# Patient Record
Sex: FEMALE | Race: WHITE | NOT HISPANIC OR LATINO | Employment: FULL TIME | URBAN - METROPOLITAN AREA
[De-identification: names, ages, dates, MRNs, and addresses within clinical notes are randomized per-mention and may not be internally consistent; named-entity substitution may affect disease eponyms.]

---

## 2017-01-01 ENCOUNTER — OFFICE VISIT (OUTPATIENT)
Dept: INTERNAL MEDICINE | Facility: CLINIC | Age: 62
End: 2017-01-01

## 2017-01-01 ENCOUNTER — INFUSION (OUTPATIENT)
Dept: ONCOLOGY | Facility: HOSPITAL | Age: 62
End: 2017-01-01

## 2017-01-01 ENCOUNTER — DOCUMENTATION (OUTPATIENT)
Dept: ONCOLOGY | Facility: CLINIC | Age: 62
End: 2017-01-01

## 2017-01-01 ENCOUNTER — APPOINTMENT (OUTPATIENT)
Dept: ONCOLOGY | Facility: CLINIC | Age: 62
End: 2017-01-01

## 2017-01-01 ENCOUNTER — TELEPHONE (OUTPATIENT)
Dept: ONCOLOGY | Facility: HOSPITAL | Age: 62
End: 2017-01-01

## 2017-01-01 ENCOUNTER — APPOINTMENT (OUTPATIENT)
Dept: ONCOLOGY | Facility: HOSPITAL | Age: 62
End: 2017-01-01

## 2017-01-01 ENCOUNTER — TELEPHONE (OUTPATIENT)
Dept: INTERNAL MEDICINE | Facility: CLINIC | Age: 62
End: 2017-01-01

## 2017-01-01 ENCOUNTER — LAB (OUTPATIENT)
Dept: LAB | Facility: HOSPITAL | Age: 62
End: 2017-01-01

## 2017-01-01 ENCOUNTER — OFFICE VISIT (OUTPATIENT)
Dept: ONCOLOGY | Facility: CLINIC | Age: 62
End: 2017-01-01

## 2017-01-01 ENCOUNTER — APPOINTMENT (OUTPATIENT)
Dept: ULTRASOUND IMAGING | Facility: HOSPITAL | Age: 62
End: 2017-01-01

## 2017-01-01 ENCOUNTER — HOSPITAL ENCOUNTER (OUTPATIENT)
Dept: PET IMAGING | Facility: HOSPITAL | Age: 62
Discharge: HOME | End: 2017-01-05
Attending: INTERNAL MEDICINE

## 2017-01-01 ENCOUNTER — TRANSCRIBE ORDERS (OUTPATIENT)
Dept: ADMINISTRATIVE | Facility: HOSPITAL | Age: 62
End: 2017-01-01

## 2017-01-01 ENCOUNTER — APPOINTMENT (OUTPATIENT)
Dept: LAB | Facility: HOSPITAL | Age: 62
End: 2017-01-01
Attending: INTERNAL MEDICINE

## 2017-01-01 ENCOUNTER — HOSPITAL ENCOUNTER (OUTPATIENT)
Dept: ULTRASOUND IMAGING | Facility: HOSPITAL | Age: 62
Discharge: HOME | End: 2017-03-27
Attending: INTERNAL MEDICINE

## 2017-01-01 ENCOUNTER — TELEPHONE (OUTPATIENT)
Dept: INTERVENTIONAL RADIOLOGY/VASCULAR | Facility: HOSPITAL | Age: 62
End: 2017-01-01

## 2017-01-01 ENCOUNTER — HOSPITAL ENCOUNTER (INPATIENT)
Facility: HOSPITAL | Age: 62
LOS: 1 days | Discharge: HOME HEALTH | End: 2017-01-24
Attending: EMERGENCY MEDICINE | Admitting: INTERNAL MEDICINE

## 2017-01-01 ENCOUNTER — HOSPITAL ENCOUNTER (OUTPATIENT)
Dept: ULTRASOUND IMAGING | Facility: HOSPITAL | Age: 62
Discharge: HOME | End: 2017-03-17
Attending: INTERNAL MEDICINE

## 2017-01-01 ENCOUNTER — CONSULT (OUTPATIENT)
Dept: ONCOLOGY | Facility: CLINIC | Age: 62
End: 2017-01-01

## 2017-01-01 ENCOUNTER — HOSPITAL ENCOUNTER (OUTPATIENT)
Dept: PET IMAGING | Facility: HOSPITAL | Age: 62
Discharge: HOME | End: 2017-01-05
Attending: INTERNAL MEDICINE | Admitting: INTERNAL MEDICINE

## 2017-01-01 ENCOUNTER — HOSPITAL ENCOUNTER (OUTPATIENT)
Dept: ULTRASOUND IMAGING | Facility: HOSPITAL | Age: 62
Discharge: HOME | End: 2017-03-01
Attending: INTERNAL MEDICINE | Admitting: INTERNAL MEDICINE

## 2017-01-01 ENCOUNTER — HOSPITAL ENCOUNTER (OUTPATIENT)
Dept: ULTRASOUND IMAGING | Facility: HOSPITAL | Age: 62
Discharge: HOME | End: 2017-02-08
Attending: INTERNAL MEDICINE | Admitting: INTERNAL MEDICINE

## 2017-01-01 ENCOUNTER — TELEPHONE (OUTPATIENT)
Dept: ONCOLOGY | Facility: CLINIC | Age: 62
End: 2017-01-01

## 2017-01-01 ENCOUNTER — HOSPITAL ENCOUNTER (OUTPATIENT)
Dept: ULTRASOUND IMAGING | Facility: HOSPITAL | Age: 62
Discharge: HOME | End: 2017-02-21
Attending: INTERNAL MEDICINE | Admitting: INTERNAL MEDICINE

## 2017-01-01 VITALS
RESPIRATION RATE: 16 BRPM | SYSTOLIC BLOOD PRESSURE: 138 MMHG | DIASTOLIC BLOOD PRESSURE: 76 MMHG | OXYGEN SATURATION: 99 % | WEIGHT: 238 LBS | HEART RATE: 104 BPM | TEMPERATURE: 98.7 F | BODY MASS INDEX: 44.93 KG/M2 | HEIGHT: 61 IN

## 2017-01-01 VITALS
SYSTOLIC BLOOD PRESSURE: 120 MMHG | HEART RATE: 87 BPM | DIASTOLIC BLOOD PRESSURE: 76 MMHG | BODY MASS INDEX: 45.02 KG/M2 | WEIGHT: 238.4 LBS

## 2017-01-01 VITALS
TEMPERATURE: 98.1 F | BODY MASS INDEX: 43.31 KG/M2 | WEIGHT: 229.4 LBS | RESPIRATION RATE: 14 BRPM | DIASTOLIC BLOOD PRESSURE: 68 MMHG | SYSTOLIC BLOOD PRESSURE: 100 MMHG | HEART RATE: 93 BPM | OXYGEN SATURATION: 96 % | HEIGHT: 61 IN

## 2017-01-01 VITALS
WEIGHT: 231 LBS | TEMPERATURE: 99.5 F | SYSTOLIC BLOOD PRESSURE: 131 MMHG | DIASTOLIC BLOOD PRESSURE: 85 MMHG | OXYGEN SATURATION: 95 % | HEART RATE: 110 BPM | BODY MASS INDEX: 43.62 KG/M2

## 2017-01-01 VITALS
TEMPERATURE: 98.2 F | WEIGHT: 236.4 LBS | HEART RATE: 92 BPM | SYSTOLIC BLOOD PRESSURE: 127 MMHG | BODY MASS INDEX: 44.64 KG/M2 | DIASTOLIC BLOOD PRESSURE: 83 MMHG

## 2017-01-01 VITALS
TEMPERATURE: 98.3 F | WEIGHT: 226.8 LBS | HEART RATE: 111 BPM | BODY MASS INDEX: 41.48 KG/M2 | SYSTOLIC BLOOD PRESSURE: 112 MMHG | DIASTOLIC BLOOD PRESSURE: 77 MMHG

## 2017-01-01 VITALS
SYSTOLIC BLOOD PRESSURE: 112 MMHG | DIASTOLIC BLOOD PRESSURE: 69 MMHG | HEIGHT: 62 IN | OXYGEN SATURATION: 98 % | HEART RATE: 112 BPM | BODY MASS INDEX: 41.04 KG/M2 | TEMPERATURE: 98.3 F | RESPIRATION RATE: 16 BRPM | WEIGHT: 223 LBS

## 2017-01-01 VITALS
SYSTOLIC BLOOD PRESSURE: 129 MMHG | WEIGHT: 225.2 LBS | HEART RATE: 114 BPM | TEMPERATURE: 98.3 F | BODY MASS INDEX: 42.52 KG/M2 | DIASTOLIC BLOOD PRESSURE: 90 MMHG

## 2017-01-01 VITALS
SYSTOLIC BLOOD PRESSURE: 130 MMHG | BODY MASS INDEX: 42.6 KG/M2 | TEMPERATURE: 97.3 F | OXYGEN SATURATION: 95 % | HEART RATE: 116 BPM | DIASTOLIC BLOOD PRESSURE: 87 MMHG | WEIGHT: 225.6 LBS

## 2017-01-01 VITALS
WEIGHT: 237.4 LBS | SYSTOLIC BLOOD PRESSURE: 112 MMHG | BODY MASS INDEX: 44.83 KG/M2 | HEART RATE: 101 BPM | DIASTOLIC BLOOD PRESSURE: 73 MMHG | TEMPERATURE: 97.8 F

## 2017-01-01 VITALS
HEIGHT: 61 IN | SYSTOLIC BLOOD PRESSURE: 120 MMHG | RESPIRATION RATE: 16 BRPM | HEART RATE: 102 BPM | TEMPERATURE: 97.4 F | BODY MASS INDEX: 42.69 KG/M2 | DIASTOLIC BLOOD PRESSURE: 72 MMHG | WEIGHT: 226.1 LBS

## 2017-01-01 VITALS
WEIGHT: 223 LBS | DIASTOLIC BLOOD PRESSURE: 74 MMHG | BODY MASS INDEX: 41.04 KG/M2 | HEIGHT: 62 IN | SYSTOLIC BLOOD PRESSURE: 111 MMHG | HEART RATE: 104 BPM | RESPIRATION RATE: 18 BRPM | TEMPERATURE: 97.3 F | OXYGEN SATURATION: 98 %

## 2017-01-01 VITALS
RESPIRATION RATE: 16 BRPM | HEIGHT: 62 IN | BODY MASS INDEX: 41.04 KG/M2 | OXYGEN SATURATION: 95 % | SYSTOLIC BLOOD PRESSURE: 110 MMHG | TEMPERATURE: 99.2 F | WEIGHT: 223 LBS | DIASTOLIC BLOOD PRESSURE: 73 MMHG | HEART RATE: 117 BPM

## 2017-01-01 VITALS
OXYGEN SATURATION: 100 % | HEART RATE: 104 BPM | SYSTOLIC BLOOD PRESSURE: 100 MMHG | RESPIRATION RATE: 18 BRPM | DIASTOLIC BLOOD PRESSURE: 62 MMHG | TEMPERATURE: 97.1 F

## 2017-01-01 VITALS
SYSTOLIC BLOOD PRESSURE: 120 MMHG | TEMPERATURE: 97.9 F | HEART RATE: 106 BPM | BODY MASS INDEX: 43.66 KG/M2 | WEIGHT: 231.2 LBS | DIASTOLIC BLOOD PRESSURE: 82 MMHG

## 2017-01-01 VITALS
SYSTOLIC BLOOD PRESSURE: 124 MMHG | BODY MASS INDEX: 43.88 KG/M2 | HEART RATE: 125 BPM | TEMPERATURE: 97.6 F | DIASTOLIC BLOOD PRESSURE: 85 MMHG | WEIGHT: 232.4 LBS

## 2017-01-01 VITALS
HEIGHT: 61 IN | SYSTOLIC BLOOD PRESSURE: 120 MMHG | HEART RATE: 102 BPM | BODY MASS INDEX: 43.35 KG/M2 | WEIGHT: 229.6 LBS | DIASTOLIC BLOOD PRESSURE: 70 MMHG | RESPIRATION RATE: 16 BRPM | TEMPERATURE: 97.7 F

## 2017-01-01 VITALS
WEIGHT: 226.6 LBS | BODY MASS INDEX: 41.45 KG/M2 | SYSTOLIC BLOOD PRESSURE: 109 MMHG | HEART RATE: 101 BPM | TEMPERATURE: 97.6 F | OXYGEN SATURATION: 98 % | DIASTOLIC BLOOD PRESSURE: 77 MMHG

## 2017-01-01 VITALS
DIASTOLIC BLOOD PRESSURE: 73 MMHG | BODY MASS INDEX: 43.61 KG/M2 | WEIGHT: 237 LBS | HEIGHT: 62 IN | SYSTOLIC BLOOD PRESSURE: 116 MMHG | OXYGEN SATURATION: 100 % | TEMPERATURE: 96.8 F | HEART RATE: 105 BPM | RESPIRATION RATE: 16 BRPM

## 2017-01-01 VITALS
SYSTOLIC BLOOD PRESSURE: 100 MMHG | DIASTOLIC BLOOD PRESSURE: 80 MMHG | BODY MASS INDEX: 40.85 KG/M2 | HEIGHT: 62 IN | TEMPERATURE: 97.4 F | WEIGHT: 222 LBS

## 2017-01-01 VITALS
HEIGHT: 61 IN | DIASTOLIC BLOOD PRESSURE: 60 MMHG | RESPIRATION RATE: 18 BRPM | WEIGHT: 235.4 LBS | HEART RATE: 110 BPM | SYSTOLIC BLOOD PRESSURE: 126 MMHG | TEMPERATURE: 97.6 F | BODY MASS INDEX: 44.45 KG/M2 | OXYGEN SATURATION: 96 %

## 2017-01-01 VITALS
BODY MASS INDEX: 43.58 KG/M2 | TEMPERATURE: 98.1 F | SYSTOLIC BLOOD PRESSURE: 118 MMHG | WEIGHT: 230.8 LBS | HEART RATE: 113 BPM | DIASTOLIC BLOOD PRESSURE: 82 MMHG

## 2017-01-01 VITALS
HEART RATE: 101 BPM | WEIGHT: 224 LBS | DIASTOLIC BLOOD PRESSURE: 76 MMHG | TEMPERATURE: 98.3 F | BODY MASS INDEX: 42.29 KG/M2 | OXYGEN SATURATION: 96 % | RESPIRATION RATE: 12 BRPM | HEIGHT: 61 IN | SYSTOLIC BLOOD PRESSURE: 118 MMHG

## 2017-01-01 VITALS
SYSTOLIC BLOOD PRESSURE: 106 MMHG | DIASTOLIC BLOOD PRESSURE: 70 MMHG | TEMPERATURE: 97.7 F | HEART RATE: 106 BPM | WEIGHT: 227.2 LBS | BODY MASS INDEX: 42.9 KG/M2

## 2017-01-01 DIAGNOSIS — C23 MALIGNANT NEOPLASM OF GALLBLADDER (HCC): ICD-10-CM

## 2017-01-01 DIAGNOSIS — G89.3 CANCER ASSOCIATED PAIN: ICD-10-CM

## 2017-01-01 DIAGNOSIS — R18.0 ASCITES, MALIGNANT: ICD-10-CM

## 2017-01-01 DIAGNOSIS — R11.2 NON-INTRACTABLE VOMITING WITH NAUSEA, UNSPECIFIED VOMITING TYPE: Primary | ICD-10-CM

## 2017-01-01 DIAGNOSIS — C23 MALIGNANT NEOPLASM OF GALLBLADDER (HCC): Primary | ICD-10-CM

## 2017-01-01 DIAGNOSIS — R11.2 NON-INTRACTABLE VOMITING WITH NAUSEA, UNSPECIFIED VOMITING TYPE: ICD-10-CM

## 2017-01-01 DIAGNOSIS — E83.42 HYPOMAGNESEMIA: Primary | ICD-10-CM

## 2017-01-01 DIAGNOSIS — E78.5 DYSLIPIDEMIA: ICD-10-CM

## 2017-01-01 DIAGNOSIS — E86.0 DEHYDRATION: Primary | ICD-10-CM

## 2017-01-01 DIAGNOSIS — R11.2 CHEMOTHERAPY-INDUCED NAUSEA AND VOMITING: Primary | ICD-10-CM

## 2017-01-01 DIAGNOSIS — R11.2 INCREASED NAUSEA AND VOMITING: ICD-10-CM

## 2017-01-01 DIAGNOSIS — N17.9 ACUTE KIDNEY INJURY (HCC): Primary | ICD-10-CM

## 2017-01-01 DIAGNOSIS — N17.9 ACUTE KIDNEY INJURY (HCC): ICD-10-CM

## 2017-01-01 DIAGNOSIS — N17.9 ACUTE RENAL FAILURE, UNSPECIFIED ACUTE RENAL FAILURE TYPE (HCC): Primary | ICD-10-CM

## 2017-01-01 DIAGNOSIS — R73.9 HYPERGLYCEMIA: ICD-10-CM

## 2017-01-01 DIAGNOSIS — E11.9 CONTROLLED TYPE 2 DIABETES MELLITUS WITHOUT COMPLICATION, WITH LONG-TERM CURRENT USE OF INSULIN (HCC): ICD-10-CM

## 2017-01-01 DIAGNOSIS — I10 BENIGN ESSENTIAL HYPERTENSION: ICD-10-CM

## 2017-01-01 DIAGNOSIS — Z79.4 UNCONTROLLED TYPE 2 DIABETES MELLITUS WITH HYPEROSMOLARITY WITHOUT COMA, WITH LONG-TERM CURRENT USE OF INSULIN (HCC): ICD-10-CM

## 2017-01-01 DIAGNOSIS — T45.1X5A CHEMOTHERAPY-INDUCED THROMBOCYTOPENIA: ICD-10-CM

## 2017-01-01 DIAGNOSIS — E83.42 HYPOMAGNESEMIA: ICD-10-CM

## 2017-01-01 DIAGNOSIS — E86.0 DEHYDRATION: ICD-10-CM

## 2017-01-01 DIAGNOSIS — E87.1 HYPONATREMIA SYNDROME: ICD-10-CM

## 2017-01-01 DIAGNOSIS — E11.9 CONTROLLED TYPE 2 DIABETES MELLITUS WITHOUT COMPLICATION, WITHOUT LONG-TERM CURRENT USE OF INSULIN (HCC): ICD-10-CM

## 2017-01-01 DIAGNOSIS — D69.59 CHEMOTHERAPY-INDUCED THROMBOCYTOPENIA: ICD-10-CM

## 2017-01-01 DIAGNOSIS — E87.1 HYPONATREMIA: ICD-10-CM

## 2017-01-01 DIAGNOSIS — R11.2 CHEMOTHERAPY-INDUCED NAUSEA AND VOMITING: ICD-10-CM

## 2017-01-01 DIAGNOSIS — J02.9 PHARYNGITIS, UNSPECIFIED ETIOLOGY: Primary | ICD-10-CM

## 2017-01-01 DIAGNOSIS — E11.00 UNCONTROLLED TYPE 2 DIABETES MELLITUS WITH HYPEROSMOLARITY WITHOUT COMA, WITH LONG-TERM CURRENT USE OF INSULIN (HCC): ICD-10-CM

## 2017-01-01 DIAGNOSIS — T45.1X5A CHEMOTHERAPY-INDUCED NAUSEA AND VOMITING: ICD-10-CM

## 2017-01-01 DIAGNOSIS — D72.823 LEUKEMOID REACTION: ICD-10-CM

## 2017-01-01 DIAGNOSIS — N17.9 ACUTE RENAL INJURY (HCC): ICD-10-CM

## 2017-01-01 DIAGNOSIS — R18.0 ASCITES, MALIGNANT: Primary | ICD-10-CM

## 2017-01-01 DIAGNOSIS — T45.1X5A CHEMOTHERAPY-INDUCED NAUSEA AND VOMITING: Primary | ICD-10-CM

## 2017-01-01 DIAGNOSIS — Z79.4 CONTROLLED TYPE 2 DIABETES MELLITUS WITHOUT COMPLICATION, WITH LONG-TERM CURRENT USE OF INSULIN (HCC): ICD-10-CM

## 2017-01-01 DIAGNOSIS — E87.6 HYPOKALEMIA: ICD-10-CM

## 2017-01-01 DIAGNOSIS — C23 CARCINOMA OF GALLBLADDER (HCC): ICD-10-CM

## 2017-01-01 DIAGNOSIS — E11.9 DIABETES MELLITUS WITHOUT COMPLICATION (HCC): Primary | ICD-10-CM

## 2017-01-01 DIAGNOSIS — C23 CARCINOMA OF GALLBLADDER (HCC): Primary | ICD-10-CM

## 2017-01-01 LAB
ALBUMIN SERPL-MCNC: 2.9 G/DL (ref 3.5–5.2)
ALBUMIN SERPL-MCNC: 3.3 G/DL (ref 3.5–5.2)
ALBUMIN SERPL-MCNC: 3.6 G/DL (ref 3.5–5.2)
ALBUMIN SERPL-MCNC: 3.7 G/DL (ref 3.5–5.2)
ALBUMIN SERPL-MCNC: 3.8 G/DL (ref 3.5–5.2)
ALBUMIN/GLOB SERPL: 0.6 G/DL (ref 1.1–2.4)
ALBUMIN/GLOB SERPL: 0.7 G/DL
ALBUMIN/GLOB SERPL: 0.7 G/DL (ref 1.1–2.4)
ALBUMIN/GLOB SERPL: 0.7 G/DL (ref 1.1–2.4)
ALBUMIN/GLOB SERPL: 0.8 G/DL
ALBUMIN/GLOB SERPL: 0.8 G/DL (ref 1.1–2.4)
ALBUMIN/GLOB SERPL: 0.8 G/DL (ref 1.1–2.4)
ALP SERPL-CCNC: 155 U/L (ref 38–116)
ALP SERPL-CCNC: 168 U/L (ref 39–117)
ALP SERPL-CCNC: 169 U/L (ref 38–116)
ALP SERPL-CCNC: 187 U/L (ref 38–116)
ALP SERPL-CCNC: 198 U/L (ref 39–117)
ALP SERPL-CCNC: 209 U/L (ref 38–116)
ALP SERPL-CCNC: 210 U/L (ref 38–116)
ALT SERPL W P-5'-P-CCNC: 13 U/L (ref 0–33)
ALT SERPL W P-5'-P-CCNC: 15 U/L (ref 0–33)
ALT SERPL W P-5'-P-CCNC: 21 U/L (ref 0–33)
ALT SERPL W P-5'-P-CCNC: 29 U/L (ref 1–33)
ALT SERPL W P-5'-P-CCNC: 33 U/L (ref 0–33)
ALT SERPL W P-5'-P-CCNC: 44 U/L (ref 0–33)
ALT SERPL W P-5'-P-CCNC: 47 U/L (ref 1–33)
ANION GAP SERPL CALCULATED.3IONS-SCNC: 13.2 MMOL/L
ANION GAP SERPL CALCULATED.3IONS-SCNC: 13.5 MMOL/L
ANION GAP SERPL CALCULATED.3IONS-SCNC: 14.4 MMOL/L
ANION GAP SERPL CALCULATED.3IONS-SCNC: 15 MMOL/L
ANION GAP SERPL CALCULATED.3IONS-SCNC: 15.4 MMOL/L
ANION GAP SERPL CALCULATED.3IONS-SCNC: 15.9 MMOL/L
ANION GAP SERPL CALCULATED.3IONS-SCNC: 16.4 MMOL/L
ANION GAP SERPL CALCULATED.3IONS-SCNC: 16.4 MMOL/L
ANION GAP SERPL CALCULATED.3IONS-SCNC: 17 MMOL/L
ANION GAP SERPL CALCULATED.3IONS-SCNC: 20.6 MMOL/L
ANISOCYTOSIS BLD QL: NORMAL
ARTERIAL PATENCY WRIST A: POSITIVE
AST SERPL-CCNC: 22 U/L (ref 0–32)
AST SERPL-CCNC: 24 U/L (ref 0–32)
AST SERPL-CCNC: 29 U/L (ref 0–32)
AST SERPL-CCNC: 31 U/L (ref 1–32)
AST SERPL-CCNC: 33 U/L (ref 1–32)
AST SERPL-CCNC: 36 U/L (ref 0–32)
AST SERPL-CCNC: 41 U/L (ref 0–32)
ATMOSPHERIC PRESS: 734.3 MMHG
BACTERIA SPEC AEROBE CULT: NORMAL
BACTERIA UR QL AUTO: ABNORMAL /HPF
BASE EXCESS BLDA CALC-SCNC: -1.9 MMOL/L (ref 0–2)
BASOPHILS # BLD AUTO: 0 10*3/MM3 (ref 0–0.2)
BASOPHILS # BLD AUTO: 0 10*3/MM3 (ref 0–0.2)
BASOPHILS # BLD AUTO: 0.01 10*3/MM3 (ref 0–0.1)
BASOPHILS # BLD AUTO: 0.01 10*3/MM3 (ref 0–0.1)
BASOPHILS # BLD AUTO: 0.02 10*3/MM3 (ref 0–0.1)
BASOPHILS # BLD AUTO: 0.06 10*3/MM3 (ref 0–0.1)
BASOPHILS NFR BLD AUTO: 0 % (ref 0–1.5)
BASOPHILS NFR BLD AUTO: 0 % (ref 0–1.5)
BASOPHILS NFR BLD AUTO: 0.1 % (ref 0–1.1)
BASOPHILS NFR BLD AUTO: 0.1 % (ref 0–1.1)
BASOPHILS NFR BLD AUTO: 0.3 % (ref 0–1.1)
BASOPHILS NFR BLD AUTO: 0.4 % (ref 0–1.1)
BDY SITE: ABNORMAL
BILIRUB SERPL-MCNC: 0.6 MG/DL (ref 0.1–1.2)
BILIRUB SERPL-MCNC: 1 MG/DL (ref 0.1–1.2)
BILIRUB SERPL-MCNC: 1 MG/DL (ref 0.1–1.2)
BILIRUB SERPL-MCNC: 1.2 MG/DL (ref 0.1–1.2)
BILIRUB SERPL-MCNC: 1.3 MG/DL (ref 0.1–1.2)
BILIRUB SERPL-MCNC: 1.3 MG/DL (ref 0.1–1.2)
BILIRUB SERPL-MCNC: 1.4 MG/DL (ref 0.1–1.2)
BILIRUB UR QL STRIP: NEGATIVE
BUN BLD-MCNC: 20 MG/DL (ref 6–20)
BUN BLD-MCNC: 41 MG/DL (ref 8–23)
BUN BLD-MCNC: 44 MG/DL (ref 6–20)
BUN BLD-MCNC: 54 MG/DL (ref 6–20)
BUN BLD-MCNC: 54 MG/DL (ref 6–20)
BUN BLD-MCNC: 60 MG/DL (ref 6–20)
BUN BLD-MCNC: 62 MG/DL (ref 8–23)
BUN BLD-MCNC: 63 MG/DL (ref 8–23)
BUN BLD-MCNC: 64 MG/DL (ref 8–23)
BUN BLD-MCNC: 68 MG/DL (ref 8–23)
BUN/CREAT SERPL: 16.3 (ref 7.3–30)
BUN/CREAT SERPL: 19.9 (ref 7–25)
BUN/CREAT SERPL: 29.5 (ref 7.3–30)
BUN/CREAT SERPL: 33.3 (ref 7–25)
BUN/CREAT SERPL: 35.7 (ref 7–25)
BUN/CREAT SERPL: 36.6 (ref 7–25)
BUN/CREAT SERPL: 37.2 (ref 7.3–30)
BUN/CREAT SERPL: 42 (ref 7–25)
BUN/CREAT SERPL: 42.7 (ref 7.3–30)
BUN/CREAT SERPL: 52.2 (ref 7.3–30)
CALCIUM SPEC-SCNC: 10 MG/DL (ref 8.6–10.5)
CALCIUM SPEC-SCNC: 10.3 MG/DL (ref 8.5–10.2)
CALCIUM SPEC-SCNC: 7.6 MG/DL (ref 8.6–10.5)
CALCIUM SPEC-SCNC: 8.6 MG/DL (ref 8.5–10.2)
CALCIUM SPEC-SCNC: 8.9 MG/DL (ref 8.6–10.5)
CALCIUM SPEC-SCNC: 9 MG/DL (ref 8.5–10.2)
CALCIUM SPEC-SCNC: 9.1 MG/DL (ref 8.6–10.5)
CALCIUM SPEC-SCNC: 9.2 MG/DL (ref 8.6–10.5)
CALCIUM SPEC-SCNC: 9.8 MG/DL (ref 8.5–10.2)
CALCIUM SPEC-SCNC: 9.8 MG/DL (ref 8.5–10.2)
CANCER AG19-9 SERPL-ACNC: 449.9 U/ML
CEA SERPL-MCNC: 17.47 NG/ML
CHLORIDE SERPL-SCNC: 79 MMOL/L (ref 98–107)
CHLORIDE SERPL-SCNC: 81 MMOL/L (ref 98–107)
CHLORIDE SERPL-SCNC: 86 MMOL/L (ref 98–107)
CHLORIDE SERPL-SCNC: 88 MMOL/L (ref 98–107)
CHLORIDE SERPL-SCNC: 89 MMOL/L (ref 98–107)
CHLORIDE SERPL-SCNC: 89 MMOL/L (ref 98–107)
CHLORIDE SERPL-SCNC: 91 MMOL/L (ref 98–107)
CHLORIDE SERPL-SCNC: 92 MMOL/L (ref 98–107)
CHLORIDE SERPL-SCNC: 92 MMOL/L (ref 98–107)
CHLORIDE SERPL-SCNC: 95 MMOL/L (ref 98–107)
CK SERPL-CCNC: 20 U/L (ref 20–180)
CLARITY UR: ABNORMAL
CO2 SERPL-SCNC: 18.6 MMOL/L (ref 22–29)
CO2 SERPL-SCNC: 19.6 MMOL/L (ref 22–29)
CO2 SERPL-SCNC: 20 MMOL/L (ref 22–29)
CO2 SERPL-SCNC: 22 MMOL/L (ref 22–29)
CO2 SERPL-SCNC: 22.1 MMOL/L (ref 22–29)
CO2 SERPL-SCNC: 22.4 MMOL/L (ref 22–29)
CO2 SERPL-SCNC: 24.6 MMOL/L (ref 22–29)
CO2 SERPL-SCNC: 25.5 MMOL/L (ref 22–29)
CO2 SERPL-SCNC: 25.8 MMOL/L (ref 22–29)
CO2 SERPL-SCNC: 26.6 MMOL/L (ref 22–29)
COLOR UR: ABNORMAL
CREAT BLD-MCNC: 1.03 MG/DL (ref 0.6–1.1)
CREAT BLD-MCNC: 1.15 MG/DL (ref 0.57–1)
CREAT BLD-MCNC: 1.15 MG/DL (ref 0.6–1.1)
CREAT BLD-MCNC: 1.23 MG/DL (ref 0.6–1.1)
CREAT BLD-MCNC: 1.45 MG/DL (ref 0.6–1.1)
CREAT BLD-MCNC: 1.5 MG/DL (ref 0.57–1)
CREAT BLD-MCNC: 1.75 MG/DL (ref 0.57–1)
CREAT BLD-MCNC: 1.83 MG/DL (ref 0.6–1.1)
CREAT BLD-MCNC: 1.86 MG/DL (ref 0.57–1)
CREAT BLD-MCNC: 3.42 MG/DL (ref 0.57–1)
CREAT UR-MCNC: 277.8 MG/DL
D-LACTATE SERPL-SCNC: 2 MMOL/L (ref 0.5–2)
D-LACTATE SERPL-SCNC: 2.8 MMOL/L (ref 0.5–2)
DEPRECATED RDW RBC AUTO: 43.7 FL (ref 37–54)
DEPRECATED RDW RBC AUTO: 44.8 FL (ref 37–49)
DEPRECATED RDW RBC AUTO: 45.1 FL (ref 37–49)
DEPRECATED RDW RBC AUTO: 45.2 FL (ref 37–49)
DEPRECATED RDW RBC AUTO: 45.3 FL (ref 37–54)
DEPRECATED RDW RBC AUTO: 46.9 FL (ref 37–49)
EOSINOPHIL # BLD AUTO: 0 10*3/MM3 (ref 0–0.36)
EOSINOPHIL # BLD AUTO: 0.02 10*3/MM3 (ref 0–0.36)
EOSINOPHIL # BLD AUTO: 0.02 10*3/MM3 (ref 0–0.7)
EOSINOPHIL # BLD AUTO: 0.02 10*3/MM3 (ref 0–0.7)
EOSINOPHIL # BLD AUTO: 0.04 10*3/MM3 (ref 0–0.36)
EOSINOPHIL # BLD AUTO: 0.11 10*3/MM3 (ref 0–0.36)
EOSINOPHIL NFR BLD AUTO: 0 % (ref 1–5)
EOSINOPHIL NFR BLD AUTO: 0.1 % (ref 1–5)
EOSINOPHIL NFR BLD AUTO: 0.5 % (ref 0.3–6.2)
EOSINOPHIL NFR BLD AUTO: 0.6 % (ref 0.3–6.2)
EOSINOPHIL NFR BLD AUTO: 0.6 % (ref 1–5)
EOSINOPHIL NFR BLD AUTO: 0.8 % (ref 1–5)
EOSINOPHIL SPEC QL MICRO: 0 % EOS/100 CELLS (ref 0–0)
ERYTHROCYTE [DISTWIDTH] IN BLOOD BY AUTOMATED COUNT: 15.1 % (ref 11.7–14.5)
ERYTHROCYTE [DISTWIDTH] IN BLOOD BY AUTOMATED COUNT: 15.2 % (ref 11.7–13)
ERYTHROCYTE [DISTWIDTH] IN BLOOD BY AUTOMATED COUNT: 15.3 % (ref 11.7–14.5)
ERYTHROCYTE [DISTWIDTH] IN BLOOD BY AUTOMATED COUNT: 15.4 % (ref 11.7–13)
ERYTHROCYTE [DISTWIDTH] IN BLOOD BY AUTOMATED COUNT: 15.5 % (ref 11.7–14.5)
ERYTHROCYTE [DISTWIDTH] IN BLOOD BY AUTOMATED COUNT: 18 % (ref 11.7–14.5)
EXPIRATION DATE: NORMAL
GFR SERPL CREATININE-BSD FRML MDRD: 14 ML/MIN/1.73
GFR SERPL CREATININE-BSD FRML MDRD: 28 ML/MIN/1.73
GFR SERPL CREATININE-BSD FRML MDRD: 28 ML/MIN/1.73
GFR SERPL CREATININE-BSD FRML MDRD: 30 ML/MIN/1.73
GFR SERPL CREATININE-BSD FRML MDRD: 35 ML/MIN/1.73
GFR SERPL CREATININE-BSD FRML MDRD: 37 ML/MIN/1.73
GFR SERPL CREATININE-BSD FRML MDRD: 44 ML/MIN/1.73
GFR SERPL CREATININE-BSD FRML MDRD: 48 ML/MIN/1.73
GFR SERPL CREATININE-BSD FRML MDRD: 48 ML/MIN/1.73
GFR SERPL CREATININE-BSD FRML MDRD: 54 ML/MIN/1.73
GLOBULIN UR ELPH-MCNC: 3.5 GM/DL (ref 1.8–3.5)
GLOBULIN UR ELPH-MCNC: 4.2 GM/DL (ref 1.8–3.5)
GLOBULIN UR ELPH-MCNC: 4.5 GM/DL
GLOBULIN UR ELPH-MCNC: 4.8 GM/DL
GLOBULIN UR ELPH-MCNC: 5.1 GM/DL (ref 1.8–3.5)
GLOBULIN UR ELPH-MCNC: 5.1 GM/DL (ref 1.8–3.5)
GLOBULIN UR ELPH-MCNC: 5.3 GM/DL (ref 1.8–3.5)
GLUCOSE BLD-MCNC: 123 MG/DL (ref 65–99)
GLUCOSE BLD-MCNC: 142 MG/DL (ref 65–99)
GLUCOSE BLD-MCNC: 198 MG/DL (ref 65–99)
GLUCOSE BLD-MCNC: 214 MG/DL (ref 74–124)
GLUCOSE BLD-MCNC: 246 MG/DL (ref 65–99)
GLUCOSE BLD-MCNC: 264 MG/DL (ref 65–99)
GLUCOSE BLD-MCNC: 275 MG/DL (ref 74–124)
GLUCOSE BLD-MCNC: 331 MG/DL (ref 74–124)
GLUCOSE BLD-MCNC: 341 MG/DL (ref 74–124)
GLUCOSE BLD-MCNC: 347 MG/DL (ref 74–124)
GLUCOSE BLDC GLUCOMTR-MCNC: 177 MG/DL (ref 70–130)
GLUCOSE BLDC GLUCOMTR-MCNC: 179 MG/DL (ref 70–130)
GLUCOSE BLDC GLUCOMTR-MCNC: 182 MG/DL (ref 70–130)
GLUCOSE BLDC GLUCOMTR-MCNC: 185 MG/DL (ref 70–130)
GLUCOSE BLDC GLUCOMTR-MCNC: 190 MG/DL (ref 70–130)
GLUCOSE BLDC GLUCOMTR-MCNC: 264 MG/DL (ref 70–130)
GLUCOSE UR STRIP-MCNC: ABNORMAL MG/DL
HCO3 BLDA-SCNC: 21 MMOL/L (ref 22–28)
HCT VFR BLD AUTO: 34.2 % (ref 34–45)
HCT VFR BLD AUTO: 35.2 % (ref 35.6–45.5)
HCT VFR BLD AUTO: 40.3 % (ref 35.6–45.5)
HCT VFR BLD AUTO: 44.6 % (ref 34–45)
HCT VFR BLD AUTO: 44.6 % (ref 34–45)
HCT VFR BLD AUTO: 45.7 % (ref 34–45)
HGB BLD-MCNC: 11.7 G/DL (ref 11.5–14.9)
HGB BLD-MCNC: 12.1 G/DL (ref 11.9–15.5)
HGB BLD-MCNC: 14.2 G/DL (ref 11.9–15.5)
HGB BLD-MCNC: 14.4 G/DL (ref 11.5–14.9)
HGB BLD-MCNC: 14.5 G/DL (ref 11.5–14.9)
HGB BLD-MCNC: 15.2 G/DL (ref 11.5–14.9)
HGB UR QL STRIP.AUTO: ABNORMAL
HOLD SPECIMEN: NORMAL
HYALINE CASTS UR QL AUTO: ABNORMAL /LPF
IMM GRANULOCYTES # BLD: 0.02 10*3/MM3 (ref 0–0.03)
IMM GRANULOCYTES # BLD: 0.03 10*3/MM3 (ref 0–0.03)
IMM GRANULOCYTES # BLD: 0.03 10*3/MM3 (ref 0–0.03)
IMM GRANULOCYTES # BLD: 0.12 10*3/MM3 (ref 0–0.03)
IMM GRANULOCYTES # BLD: 0.13 10*3/MM3 (ref 0–0.03)
IMM GRANULOCYTES # BLD: 0.48 10*3/MM3 (ref 0–0.03)
IMM GRANULOCYTES NFR BLD: 0.4 % (ref 0–0.5)
IMM GRANULOCYTES NFR BLD: 0.6 % (ref 0–0.5)
IMM GRANULOCYTES NFR BLD: 0.7 % (ref 0–0.5)
IMM GRANULOCYTES NFR BLD: 0.8 % (ref 0–0.5)
IMM GRANULOCYTES NFR BLD: 0.9 % (ref 0–0.5)
IMM GRANULOCYTES NFR BLD: 4.7 % (ref 0–0.5)
INR PPP: 1 (ref 0.8–1.2)
INR PPP: 1.1 (ref 0.9–1.1)
INTERNAL CONTROL: NORMAL
KETONES UR QL STRIP: ABNORMAL
LEUKOCYTE ESTERASE UR QL STRIP.AUTO: ABNORMAL
LYMPHOCYTES # BLD AUTO: 0.65 10*3/MM3 (ref 1–3.5)
LYMPHOCYTES # BLD AUTO: 0.83 10*3/MM3 (ref 1–3.5)
LYMPHOCYTES # BLD AUTO: 1.03 10*3/MM3 (ref 0.9–4.8)
LYMPHOCYTES # BLD AUTO: 1.05 10*3/MM3 (ref 0.9–4.8)
LYMPHOCYTES # BLD AUTO: 1.27 10*3/MM3 (ref 1–3.5)
LYMPHOCYTES # BLD AUTO: 1.32 10*3/MM3 (ref 1–3.5)
LYMPHOCYTES NFR BLD AUTO: 18.8 % (ref 20–49)
LYMPHOCYTES NFR BLD AUTO: 27.2 % (ref 19.6–45.3)
LYMPHOCYTES NFR BLD AUTO: 30 % (ref 19.6–45.3)
LYMPHOCYTES NFR BLD AUTO: 4.3 % (ref 20–49)
LYMPHOCYTES NFR BLD AUTO: 6.3 % (ref 20–49)
LYMPHOCYTES NFR BLD AUTO: 9.8 % (ref 20–49)
Lab: NORMAL
MACROCYTES BLD QL SMEAR: NORMAL
MAGNESIUM SERPL-MCNC: 1.7 MG/DL (ref 1.8–2.5)
MAGNESIUM SERPL-MCNC: 2.2 MG/DL (ref 1.8–2.5)
MAGNESIUM SERPL-MCNC: 2.3 MG/DL (ref 1.8–2.5)
MAGNESIUM SERPL-MCNC: 2.7 MG/DL (ref 1.6–2.4)
MCH RBC QN AUTO: 26.9 PG (ref 27–33)
MCH RBC QN AUTO: 27.3 PG (ref 27–33)
MCH RBC QN AUTO: 27.7 PG (ref 27–33)
MCH RBC QN AUTO: 28.4 PG (ref 26.9–32)
MCH RBC QN AUTO: 28.5 PG (ref 27–33)
MCH RBC QN AUTO: 28.8 PG (ref 26.9–32)
MCHC RBC AUTO-ENTMCNC: 32.3 G/DL (ref 32–35)
MCHC RBC AUTO-ENTMCNC: 32.5 G/DL (ref 32–35)
MCHC RBC AUTO-ENTMCNC: 33.3 G/DL (ref 32–35)
MCHC RBC AUTO-ENTMCNC: 34.2 G/DL (ref 32–35)
MCHC RBC AUTO-ENTMCNC: 34.4 G/DL (ref 32.4–36.3)
MCHC RBC AUTO-ENTMCNC: 35.2 G/DL (ref 32.4–36.3)
MCV RBC AUTO: 81.7 FL (ref 80.5–98.2)
MCV RBC AUTO: 82.6 FL (ref 80.5–98.2)
MCV RBC AUTO: 83.2 FL (ref 83–97)
MCV RBC AUTO: 83.2 FL (ref 83–97)
MCV RBC AUTO: 83.4 FL (ref 83–97)
MCV RBC AUTO: 83.8 FL (ref 83–97)
MODALITY: ABNORMAL
MONOCYTES # BLD AUTO: 0.08 10*3/MM3 (ref 0.2–1.2)
MONOCYTES # BLD AUTO: 0.21 10*3/MM3 (ref 0.2–1.2)
MONOCYTES # BLD AUTO: 0.52 10*3/MM3 (ref 0.25–0.8)
MONOCYTES # BLD AUTO: 0.54 10*3/MM3 (ref 0.25–0.8)
MONOCYTES # BLD AUTO: 0.88 10*3/MM3 (ref 0.25–0.8)
MONOCYTES # BLD AUTO: 1.16 10*3/MM3 (ref 0.25–0.8)
MONOCYTES NFR BLD AUTO: 2.1 % (ref 5–12)
MONOCYTES NFR BLD AUTO: 5 % (ref 4–12)
MONOCYTES NFR BLD AUTO: 6 % (ref 4–12)
MONOCYTES NFR BLD AUTO: 6.1 % (ref 5–12)
MONOCYTES NFR BLD AUTO: 6.6 % (ref 4–12)
MONOCYTES NFR BLD AUTO: 8 % (ref 4–12)
MUCOUS THREADS URNS QL MICRO: ABNORMAL /HPF
NEUTROPHILS # BLD AUTO: 10.93 10*3/MM3 (ref 1.5–7)
NEUTROPHILS # BLD AUTO: 17.2 10*3/MM3 (ref 1.5–7)
NEUTROPHILS # BLD AUTO: 2.15 10*3/MM3 (ref 1.9–8.1)
NEUTROPHILS # BLD AUTO: 2.68 10*3/MM3 (ref 1.9–8.1)
NEUTROPHILS # BLD AUTO: 4.85 10*3/MM3 (ref 1.5–7)
NEUTROPHILS # BLD AUTO: 8.65 10*3/MM3 (ref 1.5–7)
NEUTROPHILS NFR BLD AUTO: 62.7 % (ref 42.7–76)
NEUTROPHILS NFR BLD AUTO: 69.4 % (ref 42.7–76)
NEUTROPHILS NFR BLD AUTO: 71.9 % (ref 39–75)
NEUTROPHILS NFR BLD AUTO: 81.5 % (ref 39–75)
NEUTROPHILS NFR BLD AUTO: 83.9 % (ref 39–75)
NEUTROPHILS NFR BLD AUTO: 88.8 % (ref 39–75)
NITRITE UR QL STRIP: NEGATIVE
NRBC BLD MANUAL-RTO: 0 /100 WBC (ref 0–0)
PCO2 BLDA: 30.4 MM HG (ref 35–45)
PH BLDA: 7.45 PH UNITS (ref 7.35–7.45)
PH UR STRIP.AUTO: <=5 [PH] (ref 5–8)
PLAT MORPH BLD: NORMAL
PLATELET # BLD AUTO: 164 10*3/MM3 (ref 150–375)
PLATELET # BLD AUTO: 287 10*3/MM3 (ref 150–375)
PLATELET # BLD AUTO: 349 10*3/MM3 (ref 150–375)
PLATELET # BLD AUTO: 382 10*3/MM3 (ref 150–375)
PLATELET # BLD AUTO: 48 10*3/MM3 (ref 140–500)
PLATELET # BLD AUTO: 82 10*3/MM3 (ref 140–500)
PMV BLD AUTO: 8.7 FL (ref 8.9–12.1)
PMV BLD AUTO: 8.9 FL (ref 8.9–12.1)
PMV BLD AUTO: 8.9 FL (ref 8.9–12.1)
PMV BLD AUTO: 9 FL (ref 8.9–12.1)
PMV BLD AUTO: 9.5 FL (ref 6–12)
PMV BLD AUTO: 9.6 FL (ref 6–12)
PO2 BLDA: 97.5 MM HG (ref 80–100)
POTASSIUM BLD-SCNC: 3.3 MMOL/L (ref 3.5–5.2)
POTASSIUM BLD-SCNC: 3.8 MMOL/L (ref 3.5–4.7)
POTASSIUM BLD-SCNC: 4 MMOL/L (ref 3.5–4.7)
POTASSIUM BLD-SCNC: 4 MMOL/L (ref 3.5–4.7)
POTASSIUM BLD-SCNC: 4 MMOL/L (ref 3.5–5.2)
POTASSIUM BLD-SCNC: 4.1 MMOL/L (ref 3.5–5.2)
POTASSIUM BLD-SCNC: 4.1 MMOL/L (ref 3.5–5.2)
POTASSIUM BLD-SCNC: 4.2 MMOL/L (ref 3.5–5.2)
POTASSIUM BLD-SCNC: 4.4 MMOL/L (ref 3.5–4.7)
POTASSIUM BLD-SCNC: 5.1 MMOL/L (ref 3.5–4.7)
PROT SERPL-MCNC: 6.4 G/DL (ref 6.3–8)
PROT SERPL-MCNC: 7.5 G/DL (ref 6.3–8)
PROT SERPL-MCNC: 7.8 G/DL (ref 6–8.5)
PROT SERPL-MCNC: 8.4 G/DL (ref 6.3–8)
PROT SERPL-MCNC: 8.6 G/DL (ref 6–8.5)
PROT SERPL-MCNC: 8.8 G/DL (ref 6.3–8)
PROT SERPL-MCNC: 8.9 G/DL (ref 6.3–8)
PROT UR QL STRIP: ABNORMAL
PROTHROMBIN TIME: 12.4 SECONDS (ref 12.8–15.2)
PROTHROMBIN TIME: 13.1 SECONDS (ref 11–13.5)
RBC # BLD AUTO: 4.1 10*6/MM3 (ref 3.9–5)
RBC # BLD AUTO: 4.26 10*6/MM3 (ref 3.9–5.2)
RBC # BLD AUTO: 4.93 10*6/MM3 (ref 3.9–5.2)
RBC # BLD AUTO: 5.32 10*6/MM3 (ref 3.9–5)
RBC # BLD AUTO: 5.36 10*6/MM3 (ref 3.9–5)
RBC # BLD AUTO: 5.49 10*6/MM3 (ref 3.9–5)
RBC # UR: ABNORMAL /HPF
REF LAB TEST METHOD: ABNORMAL
S PYO AG THROAT QL: NEGATIVE
SAO2 % BLDCOA: 98 % (ref 92–99)
SODIUM BLD-SCNC: 122 MMOL/L (ref 134–145)
SODIUM BLD-SCNC: 122 MMOL/L (ref 136–145)
SODIUM BLD-SCNC: 125 MMOL/L (ref 134–145)
SODIUM BLD-SCNC: 127 MMOL/L (ref 134–145)
SODIUM BLD-SCNC: 127 MMOL/L (ref 136–145)
SODIUM BLD-SCNC: 127 MMOL/L (ref 136–145)
SODIUM BLD-SCNC: 128 MMOL/L (ref 136–145)
SODIUM BLD-SCNC: 129 MMOL/L (ref 136–145)
SODIUM BLD-SCNC: 130 MMOL/L (ref 134–145)
SODIUM BLD-SCNC: 130 MMOL/L (ref 134–145)
SODIUM UR-SCNC: <20 MMOL/L
SP GR UR STRIP: 1.02 (ref 1–1.03)
SQUAMOUS #/AREA URNS HPF: ABNORMAL /HPF
TOTAL RATE: 20 BREATHS/MINUTE
TROPONIN T SERPL-MCNC: <0.01 NG/ML (ref 0–0.03)
UROBILINOGEN UR QL STRIP: ABNORMAL
WBC MORPH BLD: NORMAL
WBC NRBC COR # BLD: 10.31 10*3/MM3 (ref 4–10)
WBC NRBC COR # BLD: 13.42 10*3/MM3 (ref 4–10)
WBC NRBC COR # BLD: 19.35 10*3/MM3 (ref 4–10)
WBC NRBC COR # BLD: 3.43 10*3/MM3 (ref 4.5–10.7)
WBC NRBC COR # BLD: 3.86 10*3/MM3 (ref 4.5–10.7)
WBC NRBC COR # BLD: 6.75 10*3/MM3 (ref 4–10)
WBC UR QL AUTO: ABNORMAL /HPF
WHOLE BLOOD HOLD SPECIMEN: NORMAL
YEAST URNS QL MICRO: ABNORMAL /HPF

## 2017-01-01 PROCEDURE — 82570 ASSAY OF URINE CREATININE: CPT | Performed by: INTERNAL MEDICINE

## 2017-01-01 PROCEDURE — 99213 OFFICE O/P EST LOW 20 MIN: CPT | Performed by: INTERNAL MEDICINE

## 2017-01-01 PROCEDURE — 63710000001 INSULIN ASPART PER 5 UNITS: Performed by: INTERNAL MEDICINE

## 2017-01-01 PROCEDURE — 25010000002 MAGNESIUM SULFATE PER 500 MG OF MAGNESIUM: Performed by: INTERNAL MEDICINE

## 2017-01-01 PROCEDURE — 99284 EMERGENCY DEPT VISIT MOD MDM: CPT

## 2017-01-01 PROCEDURE — 80048 BASIC METABOLIC PNL TOTAL CA: CPT | Performed by: INTERNAL MEDICINE

## 2017-01-01 PROCEDURE — 83735 ASSAY OF MAGNESIUM: CPT | Performed by: INTERNAL MEDICINE

## 2017-01-01 PROCEDURE — 96365 THER/PROPH/DIAG IV INF INIT: CPT | Performed by: INTERNAL MEDICINE

## 2017-01-01 PROCEDURE — 25010000002 PALONOSETRON PER 25 MCG: Performed by: INTERNAL MEDICINE

## 2017-01-01 PROCEDURE — 96361 HYDRATE IV INFUSION ADD-ON: CPT | Performed by: NURSE PRACTITIONER

## 2017-01-01 PROCEDURE — 96374 THER/PROPH/DIAG INJ IV PUSH: CPT | Performed by: INTERNAL MEDICINE

## 2017-01-01 PROCEDURE — 0 FLUDEOXYGLUCOSE F18 SOLUTION: Performed by: INTERNAL MEDICINE

## 2017-01-01 PROCEDURE — 80053 COMPREHEN METABOLIC PANEL: CPT | Performed by: INTERNAL MEDICINE

## 2017-01-01 PROCEDURE — 25010000002 ONDANSETRON PER 1 MG: Performed by: INTERNAL MEDICINE

## 2017-01-01 PROCEDURE — 25010000002 PROMETHAZINE PER 50 MG: Performed by: INTERNAL MEDICINE

## 2017-01-01 PROCEDURE — G0463 HOSPITAL OUTPT CLINIC VISIT: HCPCS | Performed by: INTERNAL MEDICINE

## 2017-01-01 PROCEDURE — 25010000002 DEXAMETHASONE SODIUM PHOSPHATE 10 MG/ML SOLUTION 1 ML VIAL: Performed by: NURSE PRACTITIONER

## 2017-01-01 PROCEDURE — 80053 COMPREHEN METABOLIC PANEL: CPT | Performed by: NURSE PRACTITIONER

## 2017-01-01 PROCEDURE — 82378 CARCINOEMBRYONIC ANTIGEN: CPT | Performed by: INTERNAL MEDICINE

## 2017-01-01 PROCEDURE — 83735 ASSAY OF MAGNESIUM: CPT

## 2017-01-01 PROCEDURE — 25010000002 PROMETHAZINE PER 50 MG: Performed by: NURSE PRACTITIONER

## 2017-01-01 PROCEDURE — 93010 ELECTROCARDIOGRAM REPORT: CPT | Performed by: INTERNAL MEDICINE

## 2017-01-01 PROCEDURE — 85025 COMPLETE CBC W/AUTO DIFF WBC: CPT | Performed by: PHYSICIAN ASSISTANT

## 2017-01-01 PROCEDURE — 87086 URINE CULTURE/COLONY COUNT: CPT | Performed by: EMERGENCY MEDICINE

## 2017-01-01 PROCEDURE — 82962 GLUCOSE BLOOD TEST: CPT

## 2017-01-01 PROCEDURE — 99232 SBSQ HOSP IP/OBS MODERATE 35: CPT | Performed by: INTERNAL MEDICINE

## 2017-01-01 PROCEDURE — 96361 HYDRATE IV INFUSION ADD-ON: CPT | Performed by: INTERNAL MEDICINE

## 2017-01-01 PROCEDURE — 83735 ASSAY OF MAGNESIUM: CPT | Performed by: EMERGENCY MEDICINE

## 2017-01-01 PROCEDURE — 84484 ASSAY OF TROPONIN QUANT: CPT | Performed by: PHYSICIAN ASSISTANT

## 2017-01-01 PROCEDURE — 99215 OFFICE O/P EST HI 40 MIN: CPT | Performed by: INTERNAL MEDICINE

## 2017-01-01 PROCEDURE — 80053 COMPREHEN METABOLIC PANEL: CPT | Performed by: PHYSICIAN ASSISTANT

## 2017-01-01 PROCEDURE — 76942 ECHO GUIDE FOR BIOPSY: CPT

## 2017-01-01 PROCEDURE — 85025 COMPLETE CBC W/AUTO DIFF WBC: CPT | Performed by: INTERNAL MEDICINE

## 2017-01-01 PROCEDURE — 25010000002 PALONOSETRON PER 25 MCG: Performed by: NURSE PRACTITIONER

## 2017-01-01 PROCEDURE — 25010000002 HEPARIN FLUSH (PORCINE) 100 UNIT/ML SOLUTION: Performed by: INTERNAL MEDICINE

## 2017-01-01 PROCEDURE — 25010000002 GEMCITABINE 200 MG/5.26ML SOLUTION 5.26 ML VIAL: Performed by: INTERNAL MEDICINE

## 2017-01-01 PROCEDURE — 82803 BLOOD GASES ANY COMBINATION: CPT

## 2017-01-01 PROCEDURE — 96417 CHEMO IV INFUS EACH ADDL SEQ: CPT | Performed by: INTERNAL MEDICINE

## 2017-01-01 PROCEDURE — 80053 COMPREHEN METABOLIC PANEL: CPT

## 2017-01-01 PROCEDURE — A9552 F18 FDG: HCPCS | Performed by: INTERNAL MEDICINE

## 2017-01-01 PROCEDURE — 96375 TX/PRO/DX INJ NEW DRUG ADDON: CPT | Performed by: NURSE PRACTITIONER

## 2017-01-01 PROCEDURE — 96360 HYDRATION IV INFUSION INIT: CPT | Performed by: INTERNAL MEDICINE

## 2017-01-01 PROCEDURE — 82550 ASSAY OF CK (CPK): CPT | Performed by: EMERGENCY MEDICINE

## 2017-01-01 PROCEDURE — 96360 HYDRATION IV INFUSION INIT: CPT | Performed by: NURSE PRACTITIONER

## 2017-01-01 PROCEDURE — 93005 ELECTROCARDIOGRAM TRACING: CPT | Performed by: PHYSICIAN ASSISTANT

## 2017-01-01 PROCEDURE — 96375 TX/PRO/DX INJ NEW DRUG ADDON: CPT | Performed by: INTERNAL MEDICINE

## 2017-01-01 PROCEDURE — 76775 US EXAM ABDO BACK WALL LIM: CPT

## 2017-01-01 PROCEDURE — 25010000002 LORAZEPAM PER 2 MG: Performed by: INTERNAL MEDICINE

## 2017-01-01 PROCEDURE — 25010000002 CISPLATIN PER 50 MG: Performed by: INTERNAL MEDICINE

## 2017-01-01 PROCEDURE — 78815 PET IMAGE W/CT SKULL-THIGH: CPT

## 2017-01-01 PROCEDURE — 25010000002 DEXAMETHASONE PER 1 MG: Performed by: INTERNAL MEDICINE

## 2017-01-01 PROCEDURE — 63710000001 INSULIN DETEMER PER 5 UNITS: Performed by: INTERNAL MEDICINE

## 2017-01-01 PROCEDURE — 87880 STREP A ASSAY W/OPTIC: CPT | Performed by: INTERNAL MEDICINE

## 2017-01-01 PROCEDURE — 96367 TX/PROPH/DG ADDL SEQ IV INF: CPT | Performed by: INTERNAL MEDICINE

## 2017-01-01 PROCEDURE — 87205 SMEAR GRAM STAIN: CPT | Performed by: INTERNAL MEDICINE

## 2017-01-01 PROCEDURE — 25010000002 HEPARIN FLUSH (PORCINE) 100 UNIT/ML SOLUTION

## 2017-01-01 PROCEDURE — 96361 HYDRATE IV INFUSION ADD-ON: CPT

## 2017-01-01 PROCEDURE — 25010000002 GEMCITABINE 1 GM/26.3ML SOLUTION 5.26 ML VIAL: Performed by: INTERNAL MEDICINE

## 2017-01-01 PROCEDURE — 25010000002 DEXAMETHASONE SODIUM PHOSPHATE 10 MG/ML SOLUTION 1 ML VIAL: Performed by: INTERNAL MEDICINE

## 2017-01-01 PROCEDURE — 96374 THER/PROPH/DIAG INJ IV PUSH: CPT | Performed by: NURSE PRACTITIONER

## 2017-01-01 PROCEDURE — 96366 THER/PROPH/DIAG IV INF ADDON: CPT | Performed by: INTERNAL MEDICINE

## 2017-01-01 PROCEDURE — 63710000001 PROMETHAZINE PER 25 MG: Performed by: INTERNAL MEDICINE

## 2017-01-01 PROCEDURE — 25010000002 GEMCITABINE 200 MG/5.26ML SOLUTION 52.6 ML VIAL: Performed by: INTERNAL MEDICINE

## 2017-01-01 PROCEDURE — 25010000002 PROMETHAZINE PER 50 MG: Performed by: EMERGENCY MEDICINE

## 2017-01-01 PROCEDURE — 85025 COMPLETE CBC W/AUTO DIFF WBC: CPT

## 2017-01-01 PROCEDURE — 83605 ASSAY OF LACTIC ACID: CPT | Performed by: EMERGENCY MEDICINE

## 2017-01-01 PROCEDURE — 84300 ASSAY OF URINE SODIUM: CPT | Performed by: INTERNAL MEDICINE

## 2017-01-01 PROCEDURE — 96413 CHEMO IV INFUSION 1 HR: CPT | Performed by: INTERNAL MEDICINE

## 2017-01-01 PROCEDURE — 96413 CHEMO IV INFUSION 1 HR: CPT | Performed by: NURSE PRACTITIONER

## 2017-01-01 PROCEDURE — 25010000002 FOSAPREPITANT PER 1 MG: Performed by: INTERNAL MEDICINE

## 2017-01-01 PROCEDURE — 36600 WITHDRAWAL OF ARTERIAL BLOOD: CPT

## 2017-01-01 PROCEDURE — 85007 BL SMEAR W/DIFF WBC COUNT: CPT | Performed by: INTERNAL MEDICINE

## 2017-01-01 PROCEDURE — 99245 OFF/OP CONSLTJ NEW/EST HI 55: CPT | Performed by: INTERNAL MEDICINE

## 2017-01-01 PROCEDURE — 36415 COLL VENOUS BLD VENIPUNCTURE: CPT | Performed by: INTERNAL MEDICINE

## 2017-01-01 PROCEDURE — 99214 OFFICE O/P EST MOD 30 MIN: CPT | Performed by: NURSE PRACTITIONER

## 2017-01-01 PROCEDURE — 85610 PROTHROMBIN TIME: CPT | Performed by: INTERNAL MEDICINE

## 2017-01-01 PROCEDURE — 25010000002 PROCHLORPERAZINE EDISYLATE PER 10 MG: Performed by: INTERNAL MEDICINE

## 2017-01-01 PROCEDURE — 96360 HYDRATION IV INFUSION INIT: CPT

## 2017-01-01 PROCEDURE — 81001 URINALYSIS AUTO W/SCOPE: CPT | Performed by: EMERGENCY MEDICINE

## 2017-01-01 PROCEDURE — 25010000002 GEMCITABINE 1 GM/26.3ML SOLUTION 26.3 ML VIAL: Performed by: INTERNAL MEDICINE

## 2017-01-01 PROCEDURE — 86301 IMMUNOASSAY TUMOR CA 19-9: CPT | Performed by: INTERNAL MEDICINE

## 2017-01-01 RX ORDER — SODIUM CHLORIDE 9 MG/ML
1000 INJECTION, SOLUTION INTRAVENOUS CONTINUOUS
Status: DISCONTINUED | OUTPATIENT
Start: 2017-01-01 | End: 2017-01-01 | Stop reason: HOSPADM

## 2017-01-01 RX ORDER — DOCUSATE SODIUM 100 MG/1
100 CAPSULE, LIQUID FILLED ORAL 2 TIMES DAILY
Status: DISCONTINUED | OUTPATIENT
Start: 2017-01-01 | End: 2017-01-01 | Stop reason: HOSPADM

## 2017-01-01 RX ORDER — PROMETHAZINE HYDROCHLORIDE 25 MG/1
25 SUPPOSITORY RECTAL EVERY 6 HOURS PRN
Status: DISCONTINUED | OUTPATIENT
Start: 2017-01-01 | End: 2017-01-01 | Stop reason: HOSPADM

## 2017-01-01 RX ORDER — PALONOSETRON 0.05 MG/ML
0.25 INJECTION, SOLUTION INTRAVENOUS ONCE
Status: CANCELLED
Start: 2017-01-01 | End: 2017-01-01

## 2017-01-01 RX ORDER — LORAZEPAM 1 MG/1
1 TABLET ORAL EVERY 8 HOURS PRN
Status: DISCONTINUED | OUTPATIENT
Start: 2017-01-01 | End: 2017-01-01 | Stop reason: HOSPADM

## 2017-01-01 RX ORDER — ALOGLIPTIN 25 MG/1
1 TABLET, FILM COATED ORAL DAILY
COMMUNITY
End: 2017-01-01 | Stop reason: SDUPTHER

## 2017-01-01 RX ORDER — DEXTROSE MONOHYDRATE 25 G/50ML
25 INJECTION, SOLUTION INTRAVENOUS
Status: DISCONTINUED | OUTPATIENT
Start: 2017-01-01 | End: 2017-01-01 | Stop reason: HOSPADM

## 2017-01-01 RX ORDER — ONDANSETRON 2 MG/ML
4 INJECTION INTRAMUSCULAR; INTRAVENOUS EVERY 6 HOURS PRN
Status: DISCONTINUED | OUTPATIENT
Start: 2017-01-01 | End: 2017-01-01

## 2017-01-01 RX ORDER — PROCHLORPERAZINE MALEATE 10 MG
10 TABLET ORAL EVERY 6 HOURS PRN
Qty: 60 TABLET | Refills: 11 | Status: SHIPPED | OUTPATIENT
Start: 2017-01-01

## 2017-01-01 RX ORDER — ACETAMINOPHEN 325 MG/1
650 TABLET ORAL EVERY 6 HOURS PRN
COMMUNITY

## 2017-01-01 RX ORDER — ACETAMINOPHEN 325 MG/1
650 TABLET ORAL EVERY 6 HOURS PRN
Status: DISCONTINUED | OUTPATIENT
Start: 2017-01-01 | End: 2017-01-01 | Stop reason: HOSPADM

## 2017-01-01 RX ORDER — SODIUM CHLORIDE 9 MG/ML
500 INJECTION, SOLUTION INTRAVENOUS ONCE
Status: COMPLETED | OUTPATIENT
Start: 2017-01-01 | End: 2017-01-01

## 2017-01-01 RX ORDER — SODIUM CHLORIDE 9 MG/ML
250 INJECTION, SOLUTION INTRAVENOUS ONCE
Status: CANCELLED | OUTPATIENT
Start: 2017-01-01

## 2017-01-01 RX ORDER — SODIUM CHLORIDE 9 MG/ML
1000 INJECTION, SOLUTION INTRAVENOUS ONCE
Status: CANCELLED | OUTPATIENT
Start: 2017-01-01

## 2017-01-01 RX ORDER — POLYETHYLENE GLYCOL 3350 17 G/17G
17 POWDER, FOR SOLUTION ORAL 2 TIMES DAILY PRN
Status: DISCONTINUED | OUTPATIENT
Start: 2017-01-01 | End: 2017-01-01 | Stop reason: HOSPADM

## 2017-01-01 RX ORDER — PROMETHAZINE HYDROCHLORIDE 25 MG/1
25 SUPPOSITORY RECTAL EVERY 6 HOURS PRN
Qty: 10 SUPPOSITORY | Refills: 1 | Status: SHIPPED | OUTPATIENT
Start: 2017-01-01

## 2017-01-01 RX ORDER — PROMETHAZINE HYDROCHLORIDE 25 MG/ML
12.5 INJECTION, SOLUTION INTRAMUSCULAR; INTRAVENOUS ONCE
Status: COMPLETED | OUTPATIENT
Start: 2017-01-01 | End: 2017-01-01

## 2017-01-01 RX ORDER — PROMETHAZINE HYDROCHLORIDE 25 MG/ML
INJECTION, SOLUTION INTRAMUSCULAR; INTRAVENOUS
Status: DISPENSED
Start: 2017-01-01 | End: 2017-01-01

## 2017-01-01 RX ORDER — LORAZEPAM 2 MG/ML
1 INJECTION INTRAMUSCULAR ONCE
Status: COMPLETED | OUTPATIENT
Start: 2017-01-01 | End: 2017-01-01

## 2017-01-01 RX ORDER — OXYCODONE HYDROCHLORIDE 5 MG/1
5 TABLET ORAL EVERY 6 HOURS PRN
Status: DISCONTINUED | OUTPATIENT
Start: 2017-01-01 | End: 2017-01-01 | Stop reason: HOSPADM

## 2017-01-01 RX ORDER — HEPARIN SODIUM 5000 [USP'U]/ML
5000 INJECTION, SOLUTION INTRAVENOUS; SUBCUTANEOUS EVERY 12 HOURS SCHEDULED
Status: DISCONTINUED | OUTPATIENT
Start: 2017-01-01 | End: 2017-01-01

## 2017-01-01 RX ORDER — SODIUM CHLORIDE 9 MG/ML
1000 INJECTION, SOLUTION INTRAVENOUS ONCE
Status: COMPLETED | OUTPATIENT
Start: 2017-01-01 | End: 2017-01-01

## 2017-01-01 RX ORDER — LORAZEPAM 2 MG/ML
0.5 INJECTION INTRAMUSCULAR EVERY 4 HOURS PRN
Status: DISCONTINUED | OUTPATIENT
Start: 2017-01-01 | End: 2017-01-01 | Stop reason: HOSPADM

## 2017-01-01 RX ORDER — SENNA AND DOCUSATE SODIUM 50; 8.6 MG/1; MG/1
1 TABLET, FILM COATED ORAL DAILY
COMMUNITY

## 2017-01-01 RX ORDER — SODIUM CHLORIDE 9 MG/ML
1000 INJECTION, SOLUTION INTRAVENOUS CONTINUOUS
Status: CANCELLED
Start: 2017-01-01

## 2017-01-01 RX ORDER — ONDANSETRON 4 MG/1
4 TABLET, FILM COATED ORAL EVERY 6 HOURS PRN
Status: DISCONTINUED | OUTPATIENT
Start: 2017-01-01 | End: 2017-01-01

## 2017-01-01 RX ORDER — PALONOSETRON 0.05 MG/ML
0.25 INJECTION, SOLUTION INTRAVENOUS ONCE
Status: COMPLETED | OUTPATIENT
Start: 2017-01-01 | End: 2017-01-01

## 2017-01-01 RX ORDER — SODIUM CHLORIDE 1000 MG
1 TABLET, SOLUBLE MISCELLANEOUS 3 TIMES DAILY
Qty: 90 TABLET | Refills: 1 | Status: SHIPPED | OUTPATIENT
Start: 2017-01-01 | End: 2017-01-01 | Stop reason: ALTCHOICE

## 2017-01-01 RX ORDER — ALPRAZOLAM 0.5 MG/1
0.5 TABLET ORAL ONCE
Status: COMPLETED | OUTPATIENT
Start: 2017-01-01 | End: 2017-01-01

## 2017-01-01 RX ORDER — OXYCODONE HYDROCHLORIDE 5 MG/1
5 TABLET ORAL EVERY 6 HOURS PRN
Qty: 60 TABLET | Refills: 0 | Status: SHIPPED | OUTPATIENT
Start: 2017-01-01

## 2017-01-01 RX ORDER — PALONOSETRON 0.05 MG/ML
0.25 INJECTION, SOLUTION INTRAVENOUS ONCE
Status: CANCELLED | OUTPATIENT
Start: 2017-01-01

## 2017-01-01 RX ORDER — SODIUM CHLORIDE 9 MG/ML
100 INJECTION, SOLUTION INTRAVENOUS CONTINUOUS
Status: DISCONTINUED | OUTPATIENT
Start: 2017-01-01 | End: 2017-01-01 | Stop reason: HOSPADM

## 2017-01-01 RX ORDER — OXYCODONE HYDROCHLORIDE 5 MG/1
TABLET ORAL
COMMUNITY
Start: 2016-01-01 | End: 2017-01-01 | Stop reason: SDUPTHER

## 2017-01-01 RX ORDER — SODIUM CHLORIDE 9 MG/ML
500 INJECTION, SOLUTION INTRAVENOUS CONTINUOUS
Status: DISCONTINUED | OUTPATIENT
Start: 2017-01-01 | End: 2017-01-01

## 2017-01-01 RX ORDER — ONDANSETRON 4 MG/1
4 TABLET, ORALLY DISINTEGRATING ORAL EVERY 6 HOURS PRN
Status: DISCONTINUED | OUTPATIENT
Start: 2017-01-01 | End: 2017-01-01

## 2017-01-01 RX ORDER — SODIUM CHLORIDE 0.9 % (FLUSH) 0.9 %
1-10 SYRINGE (ML) INJECTION AS NEEDED
Status: DISCONTINUED | OUTPATIENT
Start: 2017-01-01 | End: 2017-01-01 | Stop reason: HOSPADM

## 2017-01-01 RX ORDER — LORAZEPAM 2 MG/ML
0.25 INJECTION INTRAMUSCULAR ONCE
Status: COMPLETED | OUTPATIENT
Start: 2017-01-01 | End: 2017-01-01

## 2017-01-01 RX ORDER — LORAZEPAM 1 MG/1
1 TABLET ORAL EVERY 8 HOURS PRN
Qty: 40 TABLET | Refills: 0 | OUTPATIENT
Start: 2017-01-01 | End: 2017-01-01 | Stop reason: SDUPTHER

## 2017-01-01 RX ORDER — PROMETHAZINE HYDROCHLORIDE 25 MG/ML
12.5 INJECTION, SOLUTION INTRAMUSCULAR; INTRAVENOUS EVERY 6 HOURS PRN
Status: DISCONTINUED | OUTPATIENT
Start: 2017-01-01 | End: 2017-01-01 | Stop reason: HOSPADM

## 2017-01-01 RX ORDER — PALONOSETRON 0.05 MG/ML
0.25 INJECTION, SOLUTION INTRAVENOUS ONCE
Status: DISCONTINUED | OUTPATIENT
Start: 2017-01-01 | End: 2017-01-01

## 2017-01-01 RX ORDER — HYDROCODONE BITARTRATE AND ACETAMINOPHEN 7.5; 325 MG/1; MG/1
1 TABLET ORAL EVERY 4 HOURS PRN
COMMUNITY
End: 2017-01-01 | Stop reason: HOSPADM

## 2017-01-01 RX ORDER — GRANISETRON 3.1 MG/24H
PATCH TRANSDERMAL
COMMUNITY
Start: 2017-01-01

## 2017-01-01 RX ORDER — PROMETHAZINE HYDROCHLORIDE 25 MG/ML
6.25 INJECTION, SOLUTION INTRAMUSCULAR; INTRAVENOUS ONCE
Status: COMPLETED | OUTPATIENT
Start: 2017-01-01 | End: 2017-01-01

## 2017-01-01 RX ORDER — SODIUM CHLORIDE 9 MG/ML
1000 INJECTION, SOLUTION INTRAVENOUS ONCE
Status: CANCELLED
Start: 2017-01-01 | End: 2017-01-01

## 2017-01-01 RX ORDER — SODIUM CHLORIDE 0.9 % (FLUSH) 0.9 %
10 SYRINGE (ML) INJECTION AS NEEDED
Status: DISCONTINUED | OUTPATIENT
Start: 2017-01-01 | End: 2017-01-01 | Stop reason: HOSPADM

## 2017-01-01 RX ORDER — LORAZEPAM 2 MG/ML
0.5 INJECTION INTRAMUSCULAR AS NEEDED
Status: CANCELLED | OUTPATIENT
Start: 2017-01-01

## 2017-01-01 RX ORDER — DEXAMETHASONE 4 MG/1
4 TABLET ORAL 2 TIMES DAILY WITH MEALS
Qty: 30 TABLET | Refills: 5 | Status: SHIPPED | OUTPATIENT
Start: 2017-01-01 | End: 2017-01-01 | Stop reason: ALTCHOICE

## 2017-01-01 RX ORDER — NICOTINE POLACRILEX 4 MG
15 LOZENGE BUCCAL
Status: DISCONTINUED | OUTPATIENT
Start: 2017-01-01 | End: 2017-01-01 | Stop reason: HOSPADM

## 2017-01-01 RX ORDER — ALPRAZOLAM 1 MG/1
1 TABLET ORAL ONCE
Status: COMPLETED | OUTPATIENT
Start: 2017-01-01 | End: 2017-01-01

## 2017-01-01 RX ORDER — LORAZEPAM 1 MG/1
1 TABLET ORAL EVERY 8 HOURS PRN
Qty: 80 TABLET | Refills: 2 | Status: SHIPPED | OUTPATIENT
Start: 2017-01-01

## 2017-01-01 RX ORDER — SENNA AND DOCUSATE SODIUM 50; 8.6 MG/1; MG/1
1 TABLET, FILM COATED ORAL DAILY
Status: DISCONTINUED | OUTPATIENT
Start: 2017-01-01 | End: 2017-01-01 | Stop reason: HOSPADM

## 2017-01-01 RX ORDER — PROMETHAZINE HYDROCHLORIDE 25 MG/1
25 TABLET ORAL EVERY 6 HOURS PRN
Status: DISCONTINUED | OUTPATIENT
Start: 2017-01-01 | End: 2017-01-01

## 2017-01-01 RX ORDER — ONDANSETRON HYDROCHLORIDE 8 MG/1
8 TABLET, FILM COATED ORAL EVERY 8 HOURS PRN
Qty: 60 TABLET | Refills: 11 | Status: SHIPPED | OUTPATIENT
Start: 2017-01-01 | End: 2017-01-01 | Stop reason: ALTCHOICE

## 2017-01-01 RX ORDER — ESCITALOPRAM OXALATE 10 MG/1
10 TABLET ORAL DAILY
Status: DISCONTINUED | OUTPATIENT
Start: 2017-01-01 | End: 2017-01-01 | Stop reason: HOSPADM

## 2017-01-01 RX ADMIN — DOCUSATE SODIUM -SENNOSIDES 1 TABLET: 50; 8.6 TABLET, COATED ORAL at 07:48

## 2017-01-01 RX ADMIN — INSULIN ASPART 2 UNITS: 100 INJECTION, SOLUTION INTRAVENOUS; SUBCUTANEOUS at 08:07

## 2017-01-01 RX ADMIN — SODIUM BICARBONATE 2 ML: 84 INJECTION, SOLUTION INTRAVENOUS at 11:26

## 2017-01-01 RX ADMIN — SODIUM CHLORIDE 125 ML/HR: 9 INJECTION, SOLUTION INTRAVENOUS at 01:01

## 2017-01-01 RX ADMIN — LORAZEPAM 1 MG: 2 INJECTION INTRAMUSCULAR; INTRAVENOUS at 12:14

## 2017-01-01 RX ADMIN — PROMETHAZINE HYDROCHLORIDE 12.5 MG: 25 INJECTION INTRAMUSCULAR; INTRAVENOUS at 18:59

## 2017-01-01 RX ADMIN — SODIUM BICARBONATE 20 ML: 84 INJECTION, SOLUTION INTRAVENOUS at 12:49

## 2017-01-01 RX ADMIN — INSULIN ASPART 6 UNITS: 100 INJECTION, SOLUTION INTRAVENOUS; SUBCUTANEOUS at 11:46

## 2017-01-01 RX ADMIN — SODIUM CHLORIDE 1000 ML: 900 INJECTION, SOLUTION INTRAVENOUS at 09:45

## 2017-01-01 RX ADMIN — INSULIN ASPART 2 UNITS: 100 INJECTION, SOLUTION INTRAVENOUS; SUBCUTANEOUS at 17:57

## 2017-01-01 RX ADMIN — SODIUM CHLORIDE 2050 MG: 900 INJECTION, SOLUTION INTRAVENOUS at 10:55

## 2017-01-01 RX ADMIN — SODIUM CHLORIDE 125 ML/HR: 9 INJECTION, SOLUTION INTRAVENOUS at 11:26

## 2017-01-01 RX ADMIN — SODIUM CHLORIDE 150 MG: 9 INJECTION, SOLUTION INTRAVENOUS at 12:03

## 2017-01-01 RX ADMIN — SODIUM CHLORIDE 2050 MG: 900 INJECTION, SOLUTION INTRAVENOUS at 10:01

## 2017-01-01 RX ADMIN — SODIUM CHLORIDE 1000 ML: 900 INJECTION, SOLUTION INTRAVENOUS at 14:22

## 2017-01-01 RX ADMIN — MAGNESIUM SULFATE HEPTAHYDRATE 500 ML/HR: 500 INJECTION, SOLUTION INTRAMUSCULAR; INTRAVENOUS at 11:10

## 2017-01-01 RX ADMIN — ALPRAZOLAM 1 MG: 1 TABLET ORAL at 10:18

## 2017-01-01 RX ADMIN — SODIUM CHLORIDE 1000 ML: 9 INJECTION, SOLUTION INTRAVENOUS at 23:42

## 2017-01-01 RX ADMIN — SODIUM CHLORIDE 1000 ML: 900 INJECTION, SOLUTION INTRAVENOUS at 08:50

## 2017-01-01 RX ADMIN — LORAZEPAM 0.25 MG: 2 INJECTION INTRAMUSCULAR; INTRAVENOUS at 15:55

## 2017-01-01 RX ADMIN — SODIUM CHLORIDE 1000 ML: 900 INJECTION, SOLUTION INTRAVENOUS at 09:57

## 2017-01-01 RX ADMIN — PROMETHAZINE HYDROCHLORIDE 25 MG: 25 INJECTION INTRAMUSCULAR; INTRAVENOUS at 12:06

## 2017-01-01 RX ADMIN — SODIUM CHLORIDE 1000 ML: 900 INJECTION, SOLUTION INTRAVENOUS at 15:14

## 2017-01-01 RX ADMIN — DEXAMETHASONE SODIUM PHOSPHATE 8 MG: 10 INJECTION INTRAMUSCULAR; INTRAVENOUS at 09:43

## 2017-01-01 RX ADMIN — DEXAMETHASONE SODIUM PHOSPHATE 12 MG: 10 INJECTION INTRAMUSCULAR; INTRAVENOUS at 12:35

## 2017-01-01 RX ADMIN — MAGNESIUM SULFATE HEPTAHYDRATE 500 ML/HR: 500 INJECTION, SOLUTION INTRAMUSCULAR; INTRAVENOUS at 13:20

## 2017-01-01 RX ADMIN — SODIUM CHLORIDE 1000 ML: 900 INJECTION, SOLUTION INTRAVENOUS at 13:35

## 2017-01-01 RX ADMIN — INSULIN ASPART 2 UNITS: 100 INJECTION, SOLUTION INTRAVENOUS; SUBCUTANEOUS at 21:11

## 2017-01-01 RX ADMIN — ALPRAZOLAM 0.5 MG: 0.5 TABLET ORAL at 12:18

## 2017-01-01 RX ADMIN — SODIUM CHLORIDE 1000 ML: 900 INJECTION, SOLUTION INTRAVENOUS at 10:28

## 2017-01-01 RX ADMIN — SODIUM CHLORIDE 125 ML/HR: 9 INJECTION, SOLUTION INTRAVENOUS at 04:14

## 2017-01-01 RX ADMIN — ESCITALOPRAM 10 MG: 10 TABLET, FILM COATED ORAL at 07:48

## 2017-01-01 RX ADMIN — INSULIN DETEMIR 20 UNITS: 100 INJECTION, SOLUTION SUBCUTANEOUS at 21:11

## 2017-01-01 RX ADMIN — SODIUM CHLORIDE 500 ML: 900 INJECTION, SOLUTION INTRAVENOUS at 13:45

## 2017-01-01 RX ADMIN — PALONOSETRON HYDROCHLORIDE 0.25 MG: 0.25 INJECTION INTRAVENOUS at 09:35

## 2017-01-01 RX ADMIN — SODIUM CHLORIDE: 900 INJECTION, SOLUTION INTRAVENOUS at 15:20

## 2017-01-01 RX ADMIN — DOCUSATE SODIUM -SENNOSIDES 1 TABLET: 50; 8.6 TABLET, COATED ORAL at 10:45

## 2017-01-01 RX ADMIN — SODIUM CHLORIDE 1000 ML: 900 INJECTION, SOLUTION INTRAVENOUS at 09:55

## 2017-01-01 RX ADMIN — SODIUM CHLORIDE 1000 ML: 900 INJECTION, SOLUTION INTRAVENOUS at 09:53

## 2017-01-01 RX ADMIN — ESCITALOPRAM 10 MG: 10 TABLET, FILM COATED ORAL at 10:44

## 2017-01-01 RX ADMIN — SODIUM CHLORIDE 1000 ML: 900 INJECTION, SOLUTION INTRAVENOUS at 10:33

## 2017-01-01 RX ADMIN — SODIUM CHLORIDE 1000 ML: 9 INJECTION, SOLUTION INTRAVENOUS at 10:40

## 2017-01-01 RX ADMIN — HEPARIN SODIUM (PORCINE) LOCK FLUSH IV SOLN 100 UNIT/ML 500 UNITS: 100 SOLUTION at 12:56

## 2017-01-01 RX ADMIN — PALONOSETRON HYDROCHLORIDE 0.25 MG: 0.25 INJECTION INTRAVENOUS at 09:40

## 2017-01-01 RX ADMIN — SODIUM CHLORIDE 1750 MG: 900 INJECTION, SOLUTION INTRAVENOUS at 12:53

## 2017-01-01 RX ADMIN — FLUDEOXYGLUCOSE F18 1 DOSE: 300 INJECTION INTRAVENOUS at 10:52

## 2017-01-01 RX ADMIN — LORAZEPAM 0.25 MG: 2 INJECTION INTRAMUSCULAR; INTRAVENOUS at 14:36

## 2017-01-01 RX ADMIN — SODIUM CHLORIDE 125 ML/HR: 9 INJECTION, SOLUTION INTRAVENOUS at 17:57

## 2017-01-01 RX ADMIN — PROMETHAZINE HYDROCHLORIDE 12.5 MG: 25 INJECTION INTRAMUSCULAR; INTRAVENOUS at 10:14

## 2017-01-01 RX ADMIN — PROMETHAZINE HYDROCHLORIDE 12.5 MG: 25 INJECTION INTRAMUSCULAR; INTRAVENOUS at 23:41

## 2017-01-01 RX ADMIN — LORAZEPAM 0.5 MG: 2 INJECTION INTRAMUSCULAR; INTRAVENOUS at 13:36

## 2017-01-01 RX ADMIN — PROMETHAZINE HYDROCHLORIDE 6.25 MG: 25 INJECTION INTRAMUSCULAR; INTRAVENOUS at 15:14

## 2017-01-01 RX ADMIN — DEXAMETHASONE SODIUM PHOSPHATE 12 MG: 10 INJECTION, SOLUTION INTRAMUSCULAR; INTRAVENOUS at 09:37

## 2017-01-01 RX ADMIN — PROMETHAZINE HYDROCHLORIDE 25 MG: 25 TABLET ORAL at 07:57

## 2017-01-01 RX ADMIN — ALPRAZOLAM 0.5 MG: 0.5 TABLET ORAL at 10:40

## 2017-01-01 RX ADMIN — PROMETHAZINE HYDROCHLORIDE 12.5 MG: 25 INJECTION INTRAMUSCULAR; INTRAVENOUS at 10:15

## 2017-01-01 RX ADMIN — SODIUM BICARBONATE 20 ML: 84 INJECTION, SOLUTION INTRAVENOUS at 10:37

## 2017-01-01 RX ADMIN — SODIUM CHLORIDE, PRESERVATIVE FREE 500 UNITS: 5 INJECTION INTRAVENOUS at 16:44

## 2017-01-01 RX ADMIN — PALONOSETRON HYDROCHLORIDE 0.25 MG: 0.25 INJECTION INTRAVENOUS at 13:20

## 2017-01-01 RX ADMIN — SODIUM CHLORIDE 500 ML: 900 INJECTION, SOLUTION INTRAVENOUS at 13:44

## 2017-01-01 RX ADMIN — PROCHLORPERAZINE EDISYLATE 10 MG: 5 INJECTION INTRAMUSCULAR; INTRAVENOUS at 10:45

## 2017-01-01 RX ADMIN — CISPLATIN 75 MG: 1 INJECTION, SOLUTION INTRAVENOUS at 14:10

## 2017-01-01 RX ADMIN — INSULIN ASPART 2 UNITS: 100 INJECTION, SOLUTION INTRAVENOUS; SUBCUTANEOUS at 12:34

## 2017-01-01 RX ADMIN — DOCUSATE SODIUM 100 MG: 100 CAPSULE, LIQUID FILLED ORAL at 07:48

## 2017-01-01 RX ADMIN — PROMETHAZINE HYDROCHLORIDE 12.5 MG: 25 INJECTION INTRAMUSCULAR; INTRAVENOUS at 13:06

## 2017-01-03 PROBLEM — D72.829 LEUKOCYTOSIS: Status: ACTIVE | Noted: 2017-01-01

## 2017-01-03 PROBLEM — R18.0 ASCITES, MALIGNANT: Status: ACTIVE | Noted: 2017-01-01

## 2017-01-03 PROBLEM — G89.3 CANCER ASSOCIATED PAIN: Status: ACTIVE | Noted: 2017-01-01

## 2017-01-03 PROBLEM — N17.9 ACUTE RENAL INJURY (HCC): Status: ACTIVE | Noted: 2017-01-01

## 2017-01-03 PROBLEM — R11.2 NON-INTRACTABLE VOMITING WITH NAUSEA: Status: ACTIVE | Noted: 2017-01-01

## 2017-01-03 NOTE — PROGRESS NOTES
Pt came for her appointments today. She was sick to her stomach and vomiting in the restroom. SW talked briefly with her daughter, and offered to meet with pt at her next visit, not wanting to put any additional stress on her. Medical assistant informed. Dr. Sullivan notified.

## 2017-01-04 NOTE — PROGRESS NOTES
"REASONS FOR FOLLOWUP:    1. 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    2. The patient has been evaluated by Dr. Jesse Galeano at [facility] for possibility of re-resection for positive margins; She finished with \"neoadjuvant\" chemotherapy prior to further surgery with 3 cycles of cisplatin and Gemzar by end of December 2014.    3. Patient had ventral hepatectomy of segments 4B and 5, resection of intrahepatic bile duct, resection of extrahepatic bile duct with taken down of previous Meeta-en-Y, resection of small bowel, intrahepatic hepaticojejunostomy ×2 and periportal lymph node dissection on 06/22/2015.  Pathology evaluation showed no residual cancer.   4. Ascites fluids, paracentesis on 12/15/2016, pathology evaluation positive for adenocarcinoma.  Patient is here for further evaluation for treatment option.       HISTORY OF PRESENT ILLNESS: Ms. Ramírez is a 61-year-old  female, who was a patient of us back in 2014, whom I treated her with “neoadjuvant” chemotherapy for her gallbladder cancer. Patient subsequently had surgical intervention by Dr. Jesse Galeano at [facility]. Since that time she has PTC drainage catheter in place. She has been followed by Dr. Galeano periodically.     According to the patient and her daughter, this patient started having abdominal discomfort/distention in 10/2016, for which she actually was hospitalized at [facility], with replacement of the PTC drainage catheter.  had episodes of acute renal failure in October because of dehydration.  She eventually recovered renal function. She had CT scan examination prior to that which showed no evidence of malignancy. She did have stenosis of the biliary tree, with extension/dilatation of the intrahepatic bile duct, more so on the " left side. Nevertheless, this patient had more problem with abdominal distention in 11/2016 and 12/2016.   laboratory study on 12/12/2015 reported creatinine 0.5.        She had CT examination of the abdomen and pelvis on 12/12/2016. This study was compared to the CT scan obtained from 09/28/2016. This reported new moderate ascites within the abdomen and pelvis. There is perisplenic and mild perihepatic ascites. The right external/internal biliary drainage catheter in placement, with other relevant postsurgical changes. There remains dilatation of the left hepatic duct but improved since prior examination. There was splenomegaly measuring 15.5 cm; adrenal glands were all within normal limits. Pancreas was unremarkable and kidneys also appeared unremarkable. There are scattered known enlarged mesenteric and retroperitoneal lymph nodes. There is edema stranding with injection at the abdomen. There is nonspecific, mild nodular appearance of the mesentery within the abdomen, with peritoneal carcinomatosis cannot be excluded. There was stable epigastric and anterior hepatic lymph nodes. The stomach and small bowel loops appear within normal limits. There is no bowel obstruction. The large bowel shows scattered colonic diverticulosis without acute diverticulitis. There were no acute bony abnormalities.      Patient had paracentesis on 12/15/2016 at [facility]. Pathology evaluation with specimen #[id number] reported positive malignant cells, consistent with adenocarcinoma. Immunohistochemical staining was positive for AE1/AE3, CEA, B72.3, CDX2, CK7, CK20, but negative for calretinin, WT-1, CD68, CD15, TTF-1, mucicarmine.      Patient had nausea and vomiting, required a brief hospitalization from 12/28/2016 through 12/30/2016 at [facility], with the Surgical Oncology service. She was given hydration and supportive care. The patient reports she has improved symptoms, with less nausea, and also started to have better  drinking and eating. The patient reports the Zofran tablets were from 2 years ago, and probably does not work well. She denies fever, sweating or chills. No dysuria or hematuria. Performance status ECOG 1. She has no chest pain, no shortness of breath. She does seem to have weight loss. In the past 2 months, she lost 34 pounds per computer records. Partially, that is likely related to her ascitic fluid. She had 2nd paracentesis in end of 2016 during her hospitalization at [facility]. Her performance status is ECOG 1. Her pain is controlled with p.r.n. oxycodone. She takes about 2 tablets a day. She reports hydrocodone/acetaminophen does not work as well.       PAST MEDICAL HISTORY:    1. Diabetes, type 2, for about six years, diagnosed likely in .    2. Anxiety/depression.        SURGICAL HISTORY:    1.  section in 1979.    2. Cholecystectomy and exploratory laparotomy with choledochojejunostomy on 2014 by Dr. Duncan Barker.    3. PowerPort placement by Dr. Barker.    4. Ventral hepatectomy of segments 4B and 5, resection of intrahepatic bile duct, resection of extrahepatic bile duct with taken down off previous Meeta-en-Y, resection of small bowel, intrahepatic hepaticojejunostomy ×2 and periportal lymph node dissection on 2015.      OB-GYN HISTORY:  1, para 1. Menarche at age of 12, menopause at age 57 in .        ONCOLOGIC HISTORY: History from previous dates can be found in the separate document.        Cycle 3 of chemotherapy will be started on 2014. We plan to have repeat CT of the abdomen, pelvis and liver with three-phase together with a PET scan in the first part of 2015 as recommended by Dr. Galeano.        See HPI of 16.     MEDICATIONS: The current medication list was reviewed with the patient and updated in EMR this date per medical assistant. Medication dosages and frequencies were confirmed to be accurate.        ALLERGIES: No known  "drug allergies.        SOCIAL HISTORY: The patient is . She is  of administration in a company. No history of cigarette smoking. No alcohol use. No illegal drugs. No risk for HIV.        FAMILY HISTORY: No malignancy in the family. Both parents had heart disease and a brother has a gallbladder stone.        REVIEW OF SYSTEMS:     PAIN: See VITAL SIGNS below.    GENERAL: He history of present illness    SKIN: No rashes or nonhealing lesions.    HEME/LYMPH: No anemia and no easy bleeding bruising no lymphadenopathy.   EYES: No vision changes or diplopia.    ENT: No tinnitus, hearing loss, gum bleeding, epistaxis, hoarseness or dysphagia.    RESPIRATORY: No cough, shortness of breath, hemoptysis or wheezing.    CVS: No chest pain, palpitations, orthopnea, dyspnea on exertion or PND.    GI: See history of present illness.   : No dysuria or hematuria. No abnormal vaginal discharge or bleeding.    MUSCULOSKELETAL: No bone pain or joint stiffness.    NEUROLOGICAL: No dizziness, global weakness, loss of consciousness or seizures.    PSYCHIATRIC: No increased nervousness, mood changes or depression.        VITAL SIGNS:    Vitals:    17 1440   BP: 126/60   Pulse: 110   Resp: 18   Temp: 97.6 °F (36.4 °C)   SpO2: 96%   Weight: 235 lb 6.4 oz (107 kg)   Height: 61.02\" (155 cm)  Comment: new    PainSc: 0-No pain   ECO        PHYSICAL EXAMINATION:    GENERAL: Well-developed, moderately obese  female, not in acute distress.    SKIN: Warm, dry without rashes, purpura or petechiae.    HEAD: Normocephalic.    EYES: Pupils equal, round and reactive to light. EOMs are intact. Conjunctivae are normal.    EARS: Hearing intact.    NOSE: No excoriations or nasal discharge.    MOUTH: Tongue is well papillated. No stomatitis or ulcers. Lips are normal.    THROAT: Oropharynx without lesions or exudates.    NECK: Supple with good range of motion; no thyromegaly or masses.    LYMPHATICS: No cervical, " "supraclavicular, axillary or inguinal adenopathy.    CHEST: Lungs clear to percussion and auscultation.    CARDIAC: Regular rate and rhythm without murmurs, rubs or gallops.    ABDOMEN: Soft, nontender with no hepatosplenomegaly, right-sided abdominal wall, with PTC drainage catheter in place, has a clear light yellow colored fluid in the bag. Bowel sounds present.    EXTREMITIES: No clubbing, cyanosis or edema.    NEURO: No focal deficits.        LABORATORY DATA:      Lab Results   Component Value Date    WBC 13.42 (H) 01/03/2017    HGB 15.2 (H) 01/03/2017    HCT 45.7 (H) 01/03/2017    MCV 83.2 01/03/2017     (H) 01/03/2017     Lab Results   Component Value Date    NEUTROABS 10.93 (H) 01/03/2017     Lab Results   Component Value Date    GLUCOSE 341 (C) 01/03/2017    BUN 20 01/03/2017    CREATININE 1.23 (H) 01/03/2017    EGFRIFNONA 44 (L) 01/03/2017    EGFRIFAFRI  09/12/2016      Comment:      <15 Indicative of kidney failure.    BCR 16.3 01/03/2017    CO2 22.0 01/03/2017    CALCIUM 9.8 01/03/2017    PROTENTOTREF 7.3 01/08/2016    ALBUMIN 3.30 (L) 01/03/2017    LABIL2 0.6 (L) 01/03/2017    AST 24 01/03/2017    ALT 13 01/03/2017     SODIUM   Date Value Ref Range Status   01/03/2017 130 (L) 134 - 145 mmol/L Final   01/08/2016 140 134 - 144 mmol/L Final     POTASSIUM   Date Value Ref Range Status   01/03/2017 4.0 3.5 - 4.7 mmol/L Final   01/08/2016 4.0 3.5 - 5.2 mmol/L Final     TOTAL BILIRUBIN   Date Value Ref Range Status   01/03/2017 1.4 (H) 0.1 - 1.2 mg/dL Final   01/08/2016 0.7 0.0 - 1.2 mg/dL Final     ALKALINE PHOSPHATASE   Date Value Ref Range Status   01/03/2017 155 (H) 38 - 116 U/L Final   01/08/2016 103 39 - 117 IU/L Final   ]      ASSESSMENT:    1.  Malignant ascites, highly suspicious for metastatic gallbladder cancer. She has history of Gallbladder cancer, status post cholecystectomy in 2014, stage III (TcN0M0) with positive margin. The patient finished undergoing \"neoadjuvant” chemotherapy with 3 " cycles cisplatin and Gemzar by end of December 2014 and then had surgical resection partial hepatectomy by Dr. Jesse Galeano at the [facility] in January 2015.       Patient developed malignant ascites fluid, most likely is metastatic gallbladder cancer.  I will obtain tumor marker CA 19-9 and CEA.  In the meantime I will also obtain full body PET scan for further evaluation to see if she has any solid tumor can be biopsied.  A second malignancy cannot be fully excluded, because of the scanty cells from the ascites fluid obtained on 12/15/2015.  It only reported adenocarcinoma.  I discussed this with patient and her daughter, both of them voiced understanding.      Assuming this is metastatic gallbladder cancer, I recommended palliative chemotherapy with cisplatin on day 1 and Gemzar on day 1 and day 8 repeated every 21 days.  I clearly point out to patient this is not a curable disease.  The purpose for this is palliative, hopefully can contribute to disease, decreased production of ascites fluid, and the meantime maintaining reasonably good quality of life.  She voiced understanding.      2.  Acute renal injury secondary to dehydration because of nausea vomiting and poor oral intake.  Her creatinine is 1.23, above her baseline level at a 0.5.  We will bring patient back tomorrow for IV hydration with normal saline 1 L.  Her renal function is critical for us to proceed ahead with chemotherapy using cisplatin which carries better efficacy.     3.  Leukocytosis.  Patient has no fever sweating chose no dysuria or other signs of infection.  This is likely reactive due to her metastatic disease.     4.  Elevated hemoglobin.  This probably is because her dehydration.  Will improve with IV hydration.     5.  Hyperglycemia.  This patient is diabetic.  According to her her glucose level was well controlled around 120-150 in the morning.  Discussed with the patient, since we will use dexamethasone pre-and post  chemotherapy, certainly her glucose level will be discovered.  She she already uses insulin and a home.  Hopefully this will help control her diabetes.     6.  Nausea and vomiting secondary to malignant ascites fluid.  I prescribed the Zofran and Compazine, take every 6-8 hours as needed.  Discovered to her pharmacy.     7.  Cancer related pain.  Patient responded better to oxycodone.  I issued a new prescription today, with 5 mg/tablet total 60 tablets, no refills.     PLAN:    1. Laboratory study for  and CEA level.  2. Full body PET scan for further evaluation.  If there is solid tumor, we'll obtain CT-guided biopsy if possible.   3. Normal saline 1 L to be administered on 01/04/2017.     4. Continue use antiemetics and narcotics for pain control and nausea vomiting.  I e- scribed to her pharmacy for antiemetics, and given her profession for oxycodone.  5. I E-scribed dexamethasone, 8 mg twice a day, starting the night before cisplatin chemotherapy.   6. Patient will return in 1 week, with labs and nurse practitioner evaluation, prior to cycle 1 day 1 chemotherapy, and also NP evaluation prior to day #8 Gemzar.   7. Patient will have M.D. evaluation, in about a 4 weeks, on cycle 2 day 1 chemotherapy.  Tumor marker will be repeated at that time.     80 minutes, over half of that time were used for counseling.  Opportunities were provided to patient and her daughter for questioning, and answered to their satisfaction.         REZA WORRELL M.D., Ph.D.      01/03/2017          cc:  KARLA WATSON M.D.    RUBY ARANA M.D. ([facility])        Addendum: Laboratory study showed elevated CA 19-9 at 449.9 units/ML, and a marginally elevated CEA at 17.64 NG/ML.  With this piece of information, I do not think we need to further obtain tissue biopsy, this is metastatic gallbladder cancer based on her history and laboratory test with elevated tumor marker CA 19-9.        REZA  JEFF WORRELL., Ph.D.

## 2017-01-04 NOTE — PROGRESS NOTES
Pt was sitting in waiting area prior to coming back to infusion area.  Pt stated that she felt hot and then became nauseated and vomited.  Pt brought back to infusion area and sat in cool area.  Pt not vomiting when brought back to infusion area and pt stated that she began to feel better since sitting in a cool area.  Pt stated that she took a Zofran pill this morning and was able to keep it down and did have any vomiting this morning.  Pt has swollen stomach due to ascites.  Pt also had a question about her dexamethasone pills that she is to take with her chemotherapy.    RN spoke with Dr Sullivan regarding steroid pills and nausea.  Orders received to give pt Kytril 1mg IV today.  Steroids pills clarified with Dr Sullivan - pt to take 1pill BID day before chemo, the day of chemo - pt to take pills when she gets home, and then for 2 days after chemo - pt to take 1 pills BID.  Reviewed this with pt, pt repeated back to RN how to take the pills and her mother wrote down the information.  Pt and mother both verbalized understanding.    Pt has tube/drainage bag on right side of abdominal area.  Pt taking care of tube and drainage bag.  Dressing with clear yellow drainage on dressing and dressing changed new dressing applied.  Site intact.

## 2017-01-05 NOTE — TELEPHONE ENCOUNTER
----- Message from Cj Aviles sent at 1/5/2017 12:54 PM EST -----   Pt is feeling better but her legs are still hurting       [phone]      Returned call, no answer.  Left message to call back.

## 2017-01-10 NOTE — PROGRESS NOTES
Subjective     HISTORY OF PRESENT ILLNESS: Ms. 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    History of Present Illness  returns today for initiation of cisplatin and Gemzar.  Continues to struggle with nausea.  Continues to have the PTC drainage catheter in place and reports that her nausea improves when she is able to drain this.  She currently has parameters to drain 500 cc twice a day.  His use ondansetron rotated with prochlorperazine.  She still has 1 to 2 episodes of vomiting daily and is unable to eat or drink many fluids or foods.  She does continue to have some abdominal pain, but this is controlled with Tylenol.  She has not needed oxycodone since last week..    She denies fevers.  She did take dexamethasone yesterday in preparation for cisplatin today per Dr. Sullivan.    Past Medical History, Past Surgical History, Social History, Family History have been reviewed and are without significant changes except as mentioned.    Review of Systems   Constitutional: Positive for fatigue. Negative for fever.   HENT: Negative.    Eyes: Negative for visual disturbance.   Respiratory: Negative for chest tightness and shortness of breath.    Cardiovascular: Negative for chest pain, palpitations and leg swelling.   Gastrointestinal: Positive for abdominal distention, abdominal pain (see HPI), nausea and vomiting. Negative for blood in stool.   Genitourinary: Negative for dysuria.   Musculoskeletal: Negative for arthralgias.   Skin: Negative for color change.   Neurological: Negative for dizziness and numbness.   Hematological: Does not bruise/bleed easily.   Psychiatric/Behavioral: Negative for confusion.     Medications:  The current medication list was reviewed in the EMR    ALLERGIES:    Allergies   Allergen Reactions   • Metformin And Related        Objective      Vitals:    01/10/17 0744   BP: 120/70   Pulse: 102   Resp: 16  "  Temp: 97.7 °F (36.5 °C)   Weight: 229 lb 9.6 oz (104 kg)   Height: 61.02\" (155 cm)   PainSc: 0-No pain     Current Status 1/3/2017   ECOG score 1       Physical Exam   Constitutional: She is oriented to person, place, and time. She has a sickly appearance. No distress.   HENT:   Head: Normocephalic.   Nose: Nose normal.   Eyes: EOM are normal. Pupils are equal, round, and reactive to light.   Neck: Normal range of motion.   Cardiovascular: Normal rate.    No murmur heard.  Pulmonary/Chest: Effort normal and breath sounds normal. No respiratory distress.   Abdominal: Soft. There is tenderness. There is guarding.   Musculoskeletal: Normal range of motion.   Neurological: She is alert and oriented to person, place, and time.   Skin: Skin is warm and dry.   Psychiatric: She has a normal mood and affect.   Vitals reviewed.      RECENT LABS:  Hematology WBC   Date Value Ref Range Status   01/10/2017 19.35 (H) 4.00 - 10.00 10*3/mm3 Final   01/08/2016 7.0 3.4 - 10.8 x10E3/uL Final     RBC   Date Value Ref Range Status   01/10/2017 5.32 (H) 3.90 - 5.00 10*6/mm3 Final   01/08/2016 4.16 3.77 - 5.28 x10E6/uL Final     HEMOGLOBIN   Date Value Ref Range Status   01/10/2017 14.5 11.5 - 14.9 g/dL Final   01/08/2016 12.2 11.1 - 15.9 g/dL Final     HEMATOCRIT   Date Value Ref Range Status   01/10/2017 44.6 34.0 - 45.0 % Final   01/08/2016 37.8 34.0 - 46.6 % Final     PLATELETS   Date Value Ref Range Status   01/10/2017 349 150 - 375 10*3/mm3 Final   01/08/2016 180 150 - 379 x10E3/uL Final       Lab Results   Component Value Date    CEA 17.47 01/03/2017      449.9       Assessment/Plan   1. Malignant ascites, highly suspicious for metastatic gallbladder cancer. She has history of Gallbladder cancer, status post cholecystectomy in 2014, stage III (TcN0M0) with positive margin. The patient finished undergoing \"neoadjuvant” chemotherapy with 3 cycles cisplatin and Gemzar by end of December 2014 and will by surgical resection by " Dr. Jesse Galeano at the [facility] in January 2015.      Patient developed malignant ascites fluid, PET scan was obtained that does show metastatic disease.  With hypermetabolic carcinomatosis with omental caking.  It is also hypermetabolic activity within the liver which is unclear etiology as there is no intrahepatic mass seen on CT scan.  Concerning for intrahepatic metastasis.  He has no evidence for metastasis within the chest or neck.    She comes in today to initiate cisplatin and Gemzar however was found to have increased creatinine to 1.83.  We will adjust treatment to Gemzar only today with IV fluids.  Please see below.  Return in 1 week for review by myself with possible cisplatin and Gemzar based upon her CMP.    2.  Acute renal injury secondary to dehydration and poor oral intake.  Her creatinine was elevated at 1.23 through the last office visit she was given IV fluids.  Today it is increased further to 1.83 with a BUN at 54.  This is reviewed with Dr. Sullivan.  We will give her 1 L of normal saline in the office today in addition to Gemzar as discussed above.  She will return daily for 1 L of normal saline with repeat CMP on Thursday.    3.  Nausea and vomiting.  The patient has been using ondansetron rotating with prochlorperazine.  Due to her continued nausea and vomiting we will trial Sancuso patch.  I have given her Aloxi and dexamethasone today and she will placed a Sancuso patch tomorrow evening.  She does note not to use ondansetron during this time.  She will continue to use prochlorperazine as needed.  A prescription was requested through [facility].    4.  Diabetes.  The patient is hyperglycemic and has been monitoring her glucose level at home.  It has been erratic and she is following this with primary care.  She does use sliding scale insulin.    5.  Cancer related pain.  She does have oxycodone at home though has not needed in the past week.        More than traditional  chemotherapy monitoring was performed today during her visit as discussed above.        1/10/2017      CC:

## 2017-01-10 NOTE — PROGRESS NOTES
Per ASHLEY Roe contacted [facility] to see if patient would qualify to receive fluids at home through Home Health. Per Mann at [facility] patient would need to be home bound to receive home health due to patient has [employer]. Therefore patient will need to receive fluids at [facility] area.

## 2017-01-11 NOTE — PROGRESS NOTES
Pt reports having nausea off an on.  She took zofran this am but vomited after.  She has a sancuso patch to be applied but cannot until approx 4pm due to medicaitons given yesterday.  Discussed this with María MCCANN and orders rec'd for phenergan IV.  Explained to pt that it may make her very drowsy and instructed not to drive.  Her visitor with her said she would drive.  Ms Ramírez also states that her daughter will be home with her to apply her patch. Instructed her to call if any futher issues should arise prior to tomorrows appt.  She vu.

## 2017-01-12 NOTE — PROGRESS NOTES
Subjective     HISTORY OF PRESENT ILLNESS:     History of Present Illness  ***    Past Medical History, Past Surgical History, Social History, Family History have been reviewed and are without significant changes except as mentioned.    Review of Systems   A comprehensive 14 point review of systems was performed and was negative except as mentioned.    Medications:  The current medication list was reviewed in the EMR    ALLERGIES:    Allergies   Allergen Reactions   • Metformin And Related        Objective      Vitals:    01/12/17 1456   BP: 131/85   Pulse: 110   Temp: 99.5 °F (37.5 °C)   SpO2: 95%   Weight: 231 lb (105 kg)     Current Status 1/3/2017   ECOG score 1       Physical Exam  ***    RECENT LABS:  Hematology WBC   Date Value Ref Range Status   01/10/2017 19.35 (H) 4.00 - 10.00 10*3/mm3 Final   01/08/2016 7.0 3.4 - 10.8 x10E3/uL Final     RBC   Date Value Ref Range Status   01/10/2017 5.32 (H) 3.90 - 5.00 10*6/mm3 Final   01/08/2016 4.16 3.77 - 5.28 x10E6/uL Final     HEMOGLOBIN   Date Value Ref Range Status   01/10/2017 14.5 11.5 - 14.9 g/dL Final   01/08/2016 12.2 11.1 - 15.9 g/dL Final     HEMATOCRIT   Date Value Ref Range Status   01/10/2017 44.6 34.0 - 45.0 % Final   01/08/2016 37.8 34.0 - 46.6 % Final     PLATELETS   Date Value Ref Range Status   01/10/2017 349 150 - 375 10*3/mm3 Final   01/08/2016 180 150 - 379 x10E3/uL Final              Assessment/Plan   ***                  1/12/2017      CC:

## 2017-01-12 NOTE — PROGRESS NOTES
Spoke with patient, nausea stable. She still is having it contiuously with 2-3 vomiting episodes daily.  She is using the sancuso patch and prochlorperazine as needed.  She has contacted Dr Galeano's office with no return phone call.  I tried as well with no answer to the line for the physician prompt.  I spoke with Dr Sullivan and he suggested ativan as needed.  We will also add IVF on Saturday and Monday.

## 2017-01-13 NOTE — PROGRESS NOTES
PT REPORTS 1 EPISODE VOMITING LAST NIGHT AND 1 THIS AM. PT TOOK COMPAZINE THIS AM AND HAS A SANCUSO PATCH IN PLACE. PT DECLINES IV PHENERGAN W/ IVF'S TODAY. PT STATES THAT SHE WAS UNABLE TO P/U NEW RX FOR ATIVAN AT THE PHARMACY LAST NIGHT BUT WILL BE P/U LATER TODAY.     PT'S PORT STILL ACCESSED FROM 1/10. SITE WNL. CHANGED TEGADERM DRESSING AND BIOPATCH APPLIED. PT IS GOING TOMORROW FOR MORE IVF'S SO PORT LEFT ACCESSED.     PT'S DRAIN SITE R SIDE ABDOMEN LEAKING YELLOW FLUID. CHANGED DRESSING SURROUNDING SITE AND INSTRUCTED PT TO CALL SURGEON. PT AND PT'S MOTHER V/U.

## 2017-01-16 NOTE — PROGRESS NOTES
Patient here today for ivf, c/o nausea and vomiting all weekend.  SIOBHAN Dixon seeing patient. Orders received for a one time dose of IV ativan.   Patient left accessed with biopatch in place, for possible ivf tomorrow. Labs drawn today.

## 2017-01-16 NOTE — TELEPHONE ENCOUNTER
Pt's daughter calling because pt has been having issues with her biliary tube. She has had this tube for 2 years. Over the past few weeks she has been having trouble with it. She vomits every 3 hours. Her tube is draining approx 1500cc every day. Her daughter feels that it is in the wrong place. She has tried to get in touch with Dr. Galeano to have a tube study done. She has not been able to get ahold of his office. She said Dr. Cisse and Dr. Thapa have actually performed the studies on pt but she is not sure if she can directly schedule with them. Daughter is very concerned that her mother will end up in the hospital if this is not corrected ASAP. D/W Destiny Brown NP. Per Destiny, pt is getting fluids today so this will help with her dehydration. We need to see if our schedulers can contact Dr. Cisse or Dr. Galeano to see if she can get in to be seen ASAP. Message sent to scheduling and daughter informed. She v/u.

## 2017-01-16 NOTE — PROGRESS NOTES
The patient presented to the office today for IV fluids, and anti-emetics, as she has been persistently nauseated for several days, she is utilizing Zofran, Compazine, and Ativan.  Please see SIOBHAN Torrez most recent office note.  Upon discussion with the patient's daughter, she reports an increase in biliary drainage.  Per the daughter's report, they typically empty her biliary drain twice per day removing around 500 ml each drain.  However, over the past 2 weeks, the output has increased.  At this time, she is leaving the drain unclamped continuously, draining approximately 2000 mL's per day.  Because of his increased output, patient's clearly becoming dehydrated, equiring additional IV fluids.  She is also noted an increase in vomiting the past 3 days.  After discussion with the patient and her daughter, the patient states this has previously occurred when her biliary drain is not functioning.  She denies abdominal pain or feeling of fullness.  She reports her bowels are moving normally.  Both the patient, and Jyothi MCCANN has been attempting to contact Dr. Galeano since last week, and has been unable to reach him.     I have called and spoken with Dr. Talbot, [facility] interventional radiologist who has previously performed drain revisions along with Dr. Cisse on this patient.  I spoken with Dr. Thapa personally, he would like to schedule the patient for biliary tube injection and replacement to take place at [facility]. External order was placed in [facility] and faxed to [facility]. Drain revision is schedule at 7:00 Thursday at [facility].     The patient did receive Ativan 0.25 mg IV ×2 along with 1 L normal saline in the infusion center today.  She will return tomorrow, 1/17/2017 for evaluation by Jyothi Gorman.  At this time, she is scheduled to begin chemotherapy with cisplatin Gemzar tomorrow. However, due to upcoming drain revision, this will likely not take place. SIOBHAN Torrez will  discuss with Dr. Sullivan the appropriate timing of resuming chemotherapy tomorrow. I discussed with the patient and her daughter, it is highly unlikely the patient will receive cisplatin tomorrow given persistent uncontrolled vomiting, and dehydration.  I have instructed her not to take dexamethasone in anticipation of chemotherapy.  I have electronically prescribed Phenergan suppository for nausea control.  Patient will continue to utilizing Sancuso patch and Ativan as needed.  She was instructed not to utilize Compazine along with Phenergan.

## 2017-01-16 NOTE — TELEPHONE ENCOUNTER
----- Message from Eula Anthony sent at 1/16/2017 11:34 AM EST -----   Pt's daughter is calling because she has vomiting for 2 days and wants MD to refer her to another MD for her tub      Jenn  [phone]

## 2017-01-17 NOTE — PROGRESS NOTES
Subjective     HISTORY OF PRESENT ILLNESS: Ms. 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    History of Present Illness  returns today for her cycle 1 day 8 treatment.  She is scheduled with Dr. Talbot this Thursday to replace her biliary drain.   She has been leaving this open continuously since last Thursday.  She has however been draining to to 2-2 1/2 L daily.   her kidney function has been fluctuating in due to her nausea and fluid losses.  She continues to be nauseous.  She has not received her Sancuso patches yet and is due to take off her Sancuso that she currently has on today.  She does have Ativan at home however because it has been having trouble keeping this down.  She has not had any vomiting episodes this morning though is nauseous.      Past Medical History, Past Surgical History, Social History, Family History have been reviewed and are without significant changes except as mentioned.    Review of Systems   Constitutional: Positive for fatigue. Negative for fever.   HENT: Negative.    Eyes: Negative for visual disturbance.   Respiratory: Negative for chest tightness and shortness of breath.    Cardiovascular: Negative for chest pain, palpitations and leg swelling.   Gastrointestinal: Positive for abdominal distention, abdominal pain (see HPI), nausea and vomiting. Negative for blood in stool.   Genitourinary: Negative for dysuria.   Musculoskeletal: Negative for arthralgias.   Skin: Negative for color change.   Neurological: Negative for dizziness and numbness.   Hematological: Does not bruise/bleed easily.   Psychiatric/Behavioral: Negative for confusion.     Medications:  The current medication list was reviewed in the EMR    ALLERGIES:    Allergies   Allergen Reactions   • Metformin And Related        Objective      Vitals:    01/17/17 0824   BP: 120/72   Pulse: 102   Resp: 16   Temp: 97.4 °F (36.3 °C)  "  Weight: 226 lb 1.6 oz (103 kg)   Height: 61.02\" (155 cm)   PainSc: 0-No pain     Current Status 1/17/2017   ECOG score 0       Physical Exam   Constitutional: She is oriented to person, place, and time. She has a sickly appearance. No distress.   HENT:   Head: Normocephalic.   Nose: Nose normal.   Eyes: EOM are normal. Pupils are equal, round, and reactive to light.   Neck: Normal range of motion.   Cardiovascular: Normal rate.    No murmur heard.  Pulmonary/Chest: Effort normal and breath sounds normal. No respiratory distress.   Abdominal: Soft. There is tenderness. There is guarding.   Exam limited due to seated in wheelchair.   Musculoskeletal: Normal range of motion.   Neurological: She is alert and oriented to person, place, and time.   Skin: Skin is warm and dry.   Psychiatric: She has a normal mood and affect.   Vitals reviewed.      RECENT LABS:  Hematology WBC   Date Value Ref Range Status   01/16/2017 6.75 4.00 - 10.00 10*3/mm3 Final   01/08/2016 7.0 3.4 - 10.8 x10E3/uL Final     RBC   Date Value Ref Range Status   01/16/2017 5.36 (H) 3.90 - 5.00 10*6/mm3 Final   01/08/2016 4.16 3.77 - 5.28 x10E6/uL Final     HEMOGLOBIN   Date Value Ref Range Status   01/16/2017 14.4 11.5 - 14.9 g/dL Final   01/08/2016 12.2 11.1 - 15.9 g/dL Final     HEMATOCRIT   Date Value Ref Range Status   01/16/2017 44.6 34.0 - 45.0 % Final   01/08/2016 37.8 34.0 - 46.6 % Final     PLATELETS   Date Value Ref Range Status   01/16/2017 164 150 - 375 10*3/mm3 Final   01/08/2016 180 150 - 379 x10E3/uL Final       Lab Results   Component Value Date    CEA 17.47 01/03/2017      449.9       Assessment/Plan   1. Malignant ascites, highly suspicious for metastatic gallbladder cancer. She has history of Gallbladder cancer, status post cholecystectomy in 2014, stage III ([id number]) with positive margin. The patient finished undergoing \"neoadjuvant” chemotherapy with 3 cycles cisplatin and Gemzar by end of December 2014 and will by surgical " resection by Dr. Jesse G[facility]ano at the [facility] in January 2015.      Patient developed malignant ascites fluid, PET scan was obtained that does show metastatic disease.  With hypermetabolic carcinomatosis with omental caking.  It is also hypermetabolic activity within the liver which is unclear etiology as there is no intrahepatic mass seen on CT scan.  Concerning for intrahepatic metastasis.  She has no evidence for metastasis within the chest or neck.    She was due to initiate cisplatin and Gemzar last week however only initiated Gemzar secondary to acute renal failure.  She returns today today for day 8 treatment and we will proceed with Gemzar as discussed with Dr. Sullivan today.  The patient has received daily IV fluids last week.  We will continue this until she has her drain replaced this Thursday.  Next week she will return for evaluation in the following week be seen by Dr. Sullivan and hopefully be able to start cisplatin at that time.      2.  Acute renal injury secondary to dehydration and poor oral intake. she will continue to receive daily IV fluids until Thursday of this week, at which time Dr. Thapa will replace her biliary drain.  She will return on Friday for 1 L of normal saline however we'll have the weekend off.      3.  Nausea and vomiting.  Sancuso patch prescription was requested last week when she was given a sample patch which did help her nausea.  Unfortunately, the specialty pharmacy has not gotten in contact with the patient to deliver patches.  We did provide her with the phone number today.  We will proceed with Aloxi today in office to bridge her until the Sancuso patches arrive.  She also has been a great suppositories and Ativan at home.      4.  Diabetes.  The patient is hyperglycemic and has been monitoring her glucose level at home.  It has been erratic and she is following this with primary care.  She does use sliding scale insulin.    5.  Cancer related pain.  She  does have oxycodone  to use as needed.      6.  Hyponatremia.  Labs were obtained and reviewed yesterday however I did notice that her sodium had further declined.  We will recheck this again tomorrow.  She is on salt tablets.    More than scheduled chemotherapy monitoring was performed today during her visit as discussed above.        1/17/2017      CC:

## 2017-01-17 NOTE — PROGRESS NOTES
Pt tolerated gemzar well today.  She will return tomorrow for additional IV fluids.  Right port remains accessed for fluids tomorrow.  Flushed per protocol.  Pt denies complaints at dc.  Left in WC with mother.

## 2017-01-18 NOTE — PROGRESS NOTES
Pt in today for fluids. Has c/o nausea. Tried taking phenergan suppository last night but had to have BM too quickly after that she doesn't think she got full effect. Took oral ativan this morning to try to help but didn't do much. Reviewed with Aiyana NP. Ok to give phenergan 12.5mg IV once. Pt stated after getting it that it relieved her nausea. Due to have drain replaced tomorrow to help with nausea issue.

## 2017-01-22 NOTE — IP AVS SNAPSHOT
AFTER VISIT SUMMARY             Jacoby Ramírez           About your hospitalization     You were admitted on:  January 23, 2017 You last received care in the:  [address]       Procedures & Surgeries         Medications    If you or your caregiver advised us that you are currently taking a medication and that medication is marked below as “Resume”, this simply indicates that we have reviewed those medications to make sure our new therapy recommendations do not interfere.  If you have concerns about medications other than those new ones which we are prescribing today, please consult the physician who prescribed them (or your primary physician).  Our review of your home medications is not meant to indicate that we are directing their use.             Your Medications      CONTINUE taking these medications     acetaminophen 325 MG tablet   Take 650 mg by mouth Every 6 (Six) Hours As Needed for mild pain (1-3).   Commonly known as:  TYLENOL           Alogliptin-Pioglitazone 25-30 MG tablet   Take 1 each by mouth daily.           Dapagliflozin Propanediol 10 MG tablet   Take 1 tablet by mouth daily.   Commonly known as:  FARXIGA           dexamethasone 4 MG tablet   Take 1 tablet by mouth 2 (Two) Times a Day With Meals. 2 tabs twice a day, starting the night before Cisplatin, for 3 days.   Commonly known as:  DECADRON           ergocalciferol 11547 UNITS capsule   Take 2 capsules by mouth 1 (one) time per week.   Commonly known as:  ERGOCALCIFEROL           escitalopram 10 MG tablet   TAKE 1 TABLET BY MOUTH EVERY DAY   Last time this was given:  1/24/2017  7:48 AM   Commonly known as:  LEXAPRO           glucose blood test strip   1 each by Other route 3 (three) times a day.           hydrALAZINE 25 MG tablet   Take 1 tablet by mouth Every 12 (Twelve) Hours.   Commonly known as:  APRESOLINE           Insulin Lispro 100 UNIT/ML solution cartridge   Inject  under the skin.           Insulin Pen  Needle 32G X 4 MM misc   1 each 4 (four) times a day.           LANTUS SOLOSTAR 100 UNIT/ML injection pen   INJECT 70 UNITS UNDER THE SKIN DAILY   Generic drug:  Insulin Glargine           LORazepam 1 MG tablet   Take 1 tablet by mouth Every 8 (Eight) Hours As Needed for anxiety for up to 60 doses.   Commonly known as:  ATIVAN           ondansetron 8 MG tablet   Take 1 tablet by mouth Every 8 (Eight) Hours As Needed for nausea or vomiting.   Commonly known as:  ZOFRAN           oxyCODONE 5 MG immediate release tablet   Take 1 tablet by mouth Every 6 (Six) Hours As Needed for moderate pain (4-6).   Commonly known as:  ROXICODONE           phentermine 30 MG capsule   Take 1 capsule by mouth Every Morning.           prochlorperazine 10 MG tablet   Take 1 tablet by mouth Every 6 (Six) Hours As Needed for nausea or vomiting for up to 60 doses.   Commonly known as:  COMPAZINE           promethazine 25 MG suppository   Insert 1 suppository into the rectum Every 6 (Six) Hours As Needed for nausea or vomiting.   Commonly known as:  PHENERGAN           SANCUSO 3.1 MG/24HR   Generic drug:  granisetron           sennosides-docusate sodium 8.6-50 MG tablet   Take 1 tablet by mouth Daily.   Last time this was given:  1/24/2017  7:48 AM   Commonly known as:  SENOKOT-S           sodium chloride 1 G tablet   Take 1 tablet by mouth 3 (Three) Times a Day.             STOP taking these medications     HYDROcodone-acetaminophen 7.5-325 MG per tablet   Commonly known as:  NORCO                      Your Medications      Your Medication List           Morning Noon Evening Bedtime As Needed    acetaminophen 325 MG tablet   Take 650 mg by mouth Every 6 (Six) Hours As Needed for mild pain (1-3).   Commonly known as:  TYLENOL                                   Alogliptin-Pioglitazone 25-30 MG tablet   Take 1 each by mouth daily.                                   Dapagliflozin Propanediol 10 MG tablet   Take 1 tablet by mouth daily.   Commonly  known as:  FARXIGA                                   dexamethasone 4 MG tablet   Take 1 tablet by mouth 2 (Two) Times a Day With Meals. 2 tabs twice a day, starting the night before Cisplatin, for 3 days.   Commonly known as:  DECADRON                                      ergocalciferol 58367 UNITS capsule   Take 2 capsules by mouth 1 (one) time per week.   Commonly known as:  ERGOCALCIFEROL                                   escitalopram 10 MG tablet   TAKE 1 TABLET BY MOUTH EVERY DAY   Commonly known as:  LEXAPRO                                   glucose blood test strip   1 each by Other route 3 (three) times a day.                                            hydrALAZINE 25 MG tablet   Take 1 tablet by mouth Every 12 (Twelve) Hours.   Commonly known as:  APRESOLINE                                      Insulin Lispro 100 UNIT/ML solution cartridge   Inject  under the skin.                                   Insulin Pen Needle 32G X 4 MM misc   1 each 4 (four) times a day.                                   LANTUS SOLOSTAR 100 UNIT/ML injection pen   INJECT 70 UNITS UNDER THE SKIN DAILY   Generic drug:  Insulin Glargine                                   LORazepam 1 MG tablet   Take 1 tablet by mouth Every 8 (Eight) Hours As Needed for anxiety for up to 60 doses.   Commonly known as:  ATIVAN                                   ondansetron 8 MG tablet   Take 1 tablet by mouth Every 8 (Eight) Hours As Needed for nausea or vomiting.   Commonly known as:  ZOFRAN                                   oxyCODONE 5 MG immediate release tablet   Take 1 tablet by mouth Every 6 (Six) Hours As Needed for moderate pain (4-6).   Commonly known as:  ROXICODONE                                   phentermine 30 MG capsule   Take 1 capsule by mouth Every Morning.                                   prochlorperazine 10 MG tablet   Take 1 tablet by mouth Every 6 (Six) Hours As Needed for nausea or vomiting for up to 60 doses.   Commonly known as:   COMPAZINE                                   promethazine 25 MG suppository   Insert 1 suppository into the rectum Every 6 (Six) Hours As Needed for nausea or vomiting.   Commonly known as:  PHENERGAN                                   SANCUSO 3.1 MG/24HR   Generic drug:  granisetron                                   sennosides-docusate sodium 8.6-50 MG tablet   Take 1 tablet by mouth Daily.   Commonly known as:  SENOKOT-S                                   sodium chloride 1 G tablet   Take 1 tablet by mouth 3 (Three) Times a Day.                                                  Instructions for After Discharge        Activity Instructions     Activity as Tolerated                 Diet Instructions     Diet: Regular, Consistent Carbohydrate; Thin Liquids, No Restrictions       Discharge Diet:   Regular  Consistent Carbohydrate      Fluid Consistency:  Thin Liquids, No Restrictions             Discharge References/Attachments     KIDNEY FAILURE, EASY-TO-READ (ENGLISH)       Follow-ups for After Discharge        Follow-up Information     Follow up with [facility] .    Specialty:  Home Health Services    Contact information:    [address] [facility] [address]  [facility] [address] [phone]        Follow up with Dinorah Maria MD .    Specialty:  Internal Medicine    Contact information:    [address] [place] [address] [address]  [place] [id number] [phone]        Referrals and Follow-ups to Schedule     Follow-Up    As directed    1.  With Dr. Sullivan next week as previously scheduled  2. With Junior Negro and Froylan at [place] as needed   Follow Up Details:  as listed             Scheduled Appointments     Jan 31, 2017  8:00 AM EST   Lab with LAB CHAIR 1 [facility] ONCOLOGY [facility] LAB ([place])    [address] [facility] [address]  [facility] [address] [phone]            Jan 31, 2017  8:40 AM EST   FOLLOW UP with Vincenzo Sullivan MD PhD   [facility]  [facility] IN BLOOD DISORDERS AND CANCER ([facility] [place])    [address]  [facility] [address]   [phone]            Jan 31, 2017  9:30 AM EST   INFUSION with CHEMO INF 7 [facility]   [facility] ([place])    [address]  [facility] [address]   [phone]            Mar 14, 2017  8:30 AM EDT   Follow Up with Dinorah Maria MD   [facility] INTERNAL MEDICINE (--)    [address]  [facility] [address]   [phone]           Arrive 15 minutes prior to appointment.            Additional instructions:      Appointment with Dinorah Maria MD on Jan 26,2017 at 10:45.               MyChart Signup     Our records indicate that you have declined [facility] Chirpifyhart signup. If you would like to sign up for Chirpifyhart, please email [email or web address] or call [phone] to obtain an activation code.         Summary of Your Hospitalization        Reason for Hospitalization     Your primary diagnosis was:  Acute Kidney Failure    Your diagnoses also included:  Carcinoma Of Gallbladder, Non-Intractable Vomiting With Nausea, Type Ii Diabetes Mellitus Without Control, Dehydration      Care Providers     Provider Service Role Specialty    Jesse Lim MD Internal Medicine Attending Provider Internal Medicine    Jesse Lim MD Internal Medicine Consulting Physician  Internal Medicine     Ankit Alvarado II, MD -- Consulting Physician  Hematology and Oncology      Your Allergies  Date Reviewed: 1/23/2017    Allergen Reactions    Metformin And Related Not Noted      Patient Belongings Returned     Document Return of Belongings Flowsheet     Were the patient bedside belongings sent home?   Yes   Belongings Retrieved from Security & Sent Home   Yes    Belongings Sent to Safe   --   Medications Retrieved from Pharmacy & Sent Home   Yes              More Information      Chronic Kidney  Disease  Chronic kidney disease happens when the kidneys are damaged over a long period. The kidneys are two organs that do many important jobs in the body. These jobs include:  · Removing wastes and extra fluids from the blood.  · Making hormones that help to keep the body healthy.  · Making sure that the body has the right amount of fluids and chemicals.  Chronic kidney disease may be caused by many things. The kidney damage occurs slowly. If too much damage occurs, the kidneys may stop working the way that they should. This is dangerous. Treatment can help to slow down the damage and keep it from getting worse.  HOME CARE  · Follow your diet as told by your doctor. You may need to limit the amount of salt (sodium) and protein that you eat each day.  · Take medicines only as told by your doctor. Do not take any new medicines unless your doctor approves it.  · Quit smoking if you smoke. Talk to your doctor about programs that may help you quit smoking.  · Have your blood pressure checked regularly and keep track of the results.  · Start or keep doing an exercise plan.  · Get shots (immunizations) as told by your doctor.  · Take vitamins and minerals as told by your doctor.  · Keep all follow-up visits as told by your doctor. This is important.  GET HELP RIGHT AWAY IF:   · Your symptoms get worse.  · You have new symptoms.  · You have symptoms of end-stage kidney disease. These include:    Headaches.    Skin that is darker or lighter than normal.    Numbness in the hands or feet.    Easy bruising.    Frequent hiccups.    Stopping of menstrual periods in women.  · You have a fever.  · You are making very little pee (urine).  · You have pain or bleeding when you pee.     This information is not intended to replace advice given to you by your health care provider. Make sure you discuss any questions you have with your health care provider.     Document Released: 03/14/2011 Document Revised: 09/07/2016 Document  Reviewed: 08/16/2013  [employer] Patient Education ©2016 [employer]            SYMPTOMS OF A STROKE    Call 911 or have someone take you to the Emergency Department if you have any of the following:    · Sudden numbness or weakness of your face, arm or leg especially on one side of the body  · Sudden confusion, diffiiculty speaking or trouble understanding   · Changes in your vision or loss of sight in one eye  · Sudden severe headache with no known cause  · sudden dizziness, trouble walking, loss of balance or coordination    It is important to seek emergency care right away if you have further stroke symptoms. If you get emergency help quickly, the powerful clot-dissolving medicines can reduce the disabilities caused by a stroke.     For more information:    American Stroke Association  7-265-8-STROKE  www.strokeassociation.org           IF YOU SMOKE OR USE TOBACCO PLEASE READ THE FOLLOWING:    Why is smoking bad for me?  Smoking increases the risk of heart disease, lung disease, vascular disease, stroke, and cancer.     If you smoke, STOP!    If you would like more information on quitting smoking, please visit the [employer] website: [email or web address]/atokoreate/healthier-[email or web address]   This link will provide additional resources including the QUIT line and the Beat the Pack support groups.     For more information:    American Cancer Society  (305) 151-8989    American Heart Association  1-518.611.9818               YOU ARE THE MOST IMPORTANT FACTOR IN YOUR RECOVERY.     Follow all instructions carefully.     I have reviewed my discharge instructions with my nurse, including the following information, if applicable:     Information about my illness and diagnosis   Follow up appointments (including lab draws)   Wound Care   Equipment Needs   Medications (new and continuing) along with side effects   Preventative information such as vaccines and smoking cessations   Diet   Pain   I  know when to contact my Doctor's office or seek emergency care      I want my nurse to describe the side effects of my medications: YES NO   If the answer is no, I understand the side effects of my medications: YES NO   My nurse described the side effects of my medications in a way that I could understand: YES NO   I have taken my personal belongings and my own medications with me at discharge: YES NO            I have received this information and my questions have been answered. I have discussed any concerns I see with this plan with the nurse or physician. I understand these instructions.    Signature of Patient or Responsible Person: _____________________________________    Date: _________________  Time: __________________    Signature of Healthcare Provider: _______________________________________  Date: _________________  Time: __________________

## 2017-01-23 PROBLEM — N17.9 ACUTE RENAL FAILURE (HCC): Status: ACTIVE | Noted: 2017-01-01

## 2017-01-23 NOTE — PLAN OF CARE
Problem: Patient Care Overview (Adult)  Goal: Plan of Care Review  Outcome: Ongoing (interventions implemented as appropriate)    01/23/17 1134   Coping/Psychosocial Response Interventions   Plan Of Care Reviewed With patient   Patient Care Overview   Progress no change       Goal: Adult Individualization and Mutuality  Outcome: Ongoing (interventions implemented as appropriate)  Goal: Discharge Needs Assessment  Outcome: Ongoing (interventions implemented as appropriate)    Problem: Fluid Volume Deficit (Adult)  Goal: Fluid/Electrolyte Balance  Outcome: Ongoing (interventions implemented as appropriate)  Goal: Comfort/Well Being  Outcome: Ongoing (interventions implemented as appropriate)    Problem: Fall Risk (Adult)  Goal: Absence of Falls  Outcome: Ongoing (interventions implemented as appropriate)

## 2017-01-23 NOTE — ED NOTES
Pt family states patient has had two episodes today, the last one at 2030, where the patients hand, feet, toes begin to cramp up tightly. Patient family states the patient loses consciousness with these episodes for approx 30 secs. Pt has no seizure history. Pt states she recalls feeling the cramping sensation onset, then gets light headed. She states the next things she remembers is the cramping being gone and she waking up again.     Micheal Yeager RN  01/22/17 2001

## 2017-01-23 NOTE — H&P
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  She has been undergoing palliative chemotherapy for her malignant gallbladder cancer with cisplatin and Gemzar and is followed by the [facility] group.  Over the past 2 weeks she has been receiving intermittent IV fluids to protect her renal function so that she can continue to receive chemotherapy.  Her baseline creatinine appears to be proximally 0.5.  Here recently it has been running at about 1.5-1.8.  The patient states that her issues with nausea and vomiting have been ongoing since this past October and she's had several hospitalizations for dehydration and renal failure.  She has also had issues with increased output from her biliary tube.  This was just replaced this past Thursday at [facility].  She left there with the tube capped but within 24 hours she began leaking excessively through the incision site.  She reconnected the tube to a bag and since has had upwards of 5467-2570 cc of biliary drainage per day.  Since she has been admitted last night she has had 1500 cc of output.  Additionally, she had an episode of passing out last night which ultimately prompted her visit to the emergency room.  Became  dizzy lightheadedness and lost consciousness.  She did not hit her head.  She has had syncope before when she has become dehydrated and had excessive nausea and vomiting.  She does not identify any exacerbating factors to her nausea and vomiting.  It happens rather sporadically and virtually every day.  She denies any diarrhea.  She denies any pain or burning with urination but has had decreased urine output.  She denies any abdominal pain.  She has had malignant ascites with T Lukas performed twice in December.  She does not feel that her abdomen is distended at this time.    Past Medical History   Diagnosis Date   • Anemia    • Anxiety    • Breast cyst    • Cancer-related pain    • Cervicalgia    • Depression    • Gallbladder cancer      Stage III (T3N0M0) gallbladder malignancy with involvement of the common bile duct, status post surgical resection with positive margins on 09/27/2014.   • H/O abnormal mammogram 01/01/2013     Had bx in 02/2013 that was benign   • H/O Acute renal failure    • H/O Acute renal injury      Secondary to dehydration because of nausea, vomiting and poor oral intake.    • H/O Dyslipidemia    • H/O Elevated hemoglobin    • H/O Elevated liver enzymes    • H/O fatigue    • H/O hyperglycemia    • H/O leukocytosis    • H/O Malignant ascites    • H/O nausea and vomiting      secondary to malignant ascites fluid.    • H/O splenomegaly    • History of weight loss    • Hx of being hospitalized      From 12/28/2016 through 12/30/2016 at [facility], nausea and vomiting.    • Hyperlipidemia    • Hypertension      Benign essential   • Mood disorder    • Morbid obesity    • Pain in limb    • SOB (shortness of breath) on exertion    • Type 2 diabetes mellitus    • Vitamin D deficiency      Past Surgical History   Procedure Laterality Date   • Liver surgery     • Pap smear N/A 01/01/2010   • Gallbladder surgery  06/22/2015     Hepatectomy of segments 4B and 5, resection of intrahepatic bile duct,  resection of extrahepatic bile duct with taking down of previous Meeta-en-Y, resection of small bowel, intrahepatic hepaticojejunostomy x2 and periportal lymph node dissection on 2015. Pathology showed no residual cancer.   • Paracentesis  12/15/2016     Ascites fluids, pathology evaluation positive for adenocarcinoma.    •  section  1979   • Cholecystectomy  2014     and exploratory laparotomy with choledochojejunostomy by Dr. Duncan Barker.    • Venous access device (port) insertion       PowerPort by Dr. Barker.     Family History   Problem Relation Age of Onset   • Other Mother      Irregular heart beats   • Heart disease Mother    • Heart attack Father    • Heart disease Father    • No Known Problems Sister    • Cholelithiasis Brother    • Hypertension Maternal Grandmother    • Hypertension Maternal Grandfather    • Diabetes Maternal Grandfather      Social History   Substance Use Topics   • Smoking status: Never Smoker   • Smokeless tobacco: Never Used   • Alcohol use No     Prescriptions Prior to Admission   Medication Sig Dispense Refill Last Dose   • Alogliptin-Pioglitazone 25-30 MG tablet Take 1 each by mouth daily. 90 tablet 2 Taking   • Dapagliflozin Propanediol (FARXIGA) 10 MG tablet Take 1 tablet by mouth daily. 90 tablet 2 Taking   • escitalopram (LEXAPRO) 10 MG tablet TAKE 1 TABLET BY MOUTH EVERY DAY 90 tablet 5 Taking   • glucose blood test strip 1 each by Other route 3 (three) times a day. 300 each 3 Taking   • Insulin Lispro 100 UNIT/ML solution cartridge Inject  under the skin.   Taking   • Insulin Pen Needle 32G X 4 MM misc 1 each 4 (four) times a day. 120 each 12 Taking   • LANTUS SOLOSTAR 100 UNIT/ML injection pen INJECT 70 UNITS UNDER THE SKIN DAILY 30 mL 0 Taking   • LORazepam (ATIVAN) 1 MG tablet Take 1 tablet by mouth Every 8 (Eight) Hours As Needed for anxiety for up to 60 doses. 40 tablet 0 Taking   • oxyCODONE (ROXICODONE) 5 MG immediate release tablet Take 1  tablet by mouth Every 6 (Six) Hours As Needed for moderate pain (4-6). 60 tablet 0 Taking   • prochlorperazine (COMPAZINE) 10 MG tablet Take 1 tablet by mouth Every 6 (Six) Hours As Needed for nausea or vomiting for up to 60 doses. 60 tablet 11 Taking   • promethazine (PHENERGAN) 25 MG suppository Insert 1 suppository into the rectum Every 6 (Six) Hours As Needed for nausea or vomiting. 10 suppository 1 Taking   • SANCUSO 3.1 MG/24HR    Taking   • sennosides-docusate sodium (SENOKOT-S) 8.6-50 MG tablet Take 1 tablet by mouth Daily.   Taking   • acetaminophen (TYLENOL) 325 MG tablet Take 650 mg by mouth Every 6 (Six) Hours As Needed for mild pain (1-3).   Taking   • dexamethasone (DECADRON) 4 MG tablet Take 1 tablet by mouth 2 (Two) Times a Day With Meals. 2 tabs twice a day, starting the night before Cisplatin, for 3 days. 30 tablet 5 Taking   • ergocalciferol (ERGOCALCIFEROL) 09103 UNITS capsule Take 2 capsules by mouth 1 (one) time per week. 180 capsule 1 Taking   • hydrALAZINE (APRESOLINE) 25 MG tablet Take 1 tablet by mouth Every 12 (Twelve) Hours.  4 Taking   • HYDROcodone-acetaminophen (NORCO) 7.5-325 MG per tablet Take 1 tablet by mouth Every 4 (Four) Hours As Needed for moderate pain (4-6).   Taking   • ondansetron (ZOFRAN) 8 MG tablet Take 1 tablet by mouth Every 8 (Eight) Hours As Needed for nausea or vomiting. 60 tablet 11 Taking   • phentermine 30 MG capsule Take 1 capsule by mouth Every Morning. 30 capsule 0 Taking   • sodium chloride 1 G tablet Take 1 tablet by mouth 3 (Three) Times a Day. 90 tablet 1 Taking     Allergies:  Metformin and related    Review of Systems   Constitutional: Negative for chills and fever.   HENT: Negative for congestion and sore throat.    Eyes: Negative for visual disturbance.   Respiratory: Negative for cough, chest tightness, shortness of breath and wheezing.    Cardiovascular: Negative for chest pain, palpitations and leg swelling.   Gastrointestinal: Positive for nausea  and vomiting. Negative for abdominal distention, abdominal pain and diarrhea.   Endocrine: Negative for polydipsia and polyuria.   Genitourinary: Negative for difficulty urinating, dysuria, frequency and urgency.   Musculoskeletal: Negative for arthralgias and myalgias.   Skin: Negative for color change and rash.   Neurological: Negative for dizziness and light-headedness.        PHYSICAL EXAM    Vital Signs  tMax 24 hrs:  Temp (24hrs), Av.6 °F (36.4 °C), Min:97.5 °F (36.4 °C), Max:97.7 °F (36.5 °C)    Vitals Ranges:  Temp:  [97.5 °F (36.4 °C)-97.7 °F (36.5 °C)] 97.5 °F (36.4 °C)  Heart Rate:  [] 95  Resp:  [16] 16  BP: ()/(69-89) 111/69    Physical Exam   Constitutional: She is oriented to person, place, and time. She appears well-developed and well-nourished.   HENT:   Head: Normocephalic and atraumatic.   Eyes: EOM are normal. Pupils are equal, round, and reactive to light.   Neck: Neck supple. No tracheal deviation present.   Cardiovascular: Normal rate and regular rhythm.  Exam reveals no gallop.    No murmur heard.  Pulmonary/Chest: Effort normal. No respiratory distress. She has no wheezes.   Abdominal: Soft. Bowel sounds are normal. She exhibits no distension. There is no tenderness.   Biliary tube from the right upper quadrant that is draining   Musculoskeletal: She exhibits no edema or tenderness.   Neurological: She is alert and oriented to person, place, and time. No cranial nerve deficit.   Skin: Skin is warm and dry.   Nursing note and vitals reviewed.      Results Review:    Results from last 7 days  Lab Units 17  2338   WBC 10*3/mm3 3.86*   HEMOGLOBIN g/dL 14.2   HEMATOCRIT % 40.3   PLATELETS 10*3/mm3 82*       Results from last 7 days  Lab Units 17  2338   SODIUM mmol/L 122*   POTASSIUM mmol/L 4.2   CHLORIDE mmol/L 79*   TOTAL CO2 mmol/L 22.4   BUN mg/dL 68*   CREATININE mg/dL 3.42*   CALCIUM mg/dL 10.0   BILIRUBIN mg/dL 1.0   ALK PHOS U/L 198*   ALT (SGPT) U/L 47*   AST  (SGOT) U/L 33*   GLUCOSE mg/dL 264*     Lactate elevated at 2.8, now 2.0 after fluids  Creatinine kinase 20  Magnesium 2.7  Troponin less than 0.01    Urinalysis shows 1+ leukocyte esterase, trace bacteria, 6-12 white blood cells, and 3-6 squamous epithelial cells.     I reviewed the patient's new clinical results.      Principal Problem:    Acute renal failure  Active Problems:    Uncontrolled type 2 diabetes mellitus    Dehydration    Carcinoma of gallbladder    Non-intractable vomiting with nausea   Leukopenia and thrombocytopenia    Assessment & Plan    The patient has been admitted.  She has been started on IV fluids.  Will check urine studies as well as renal ultrasound and postvoid residual to further evaluate her renal function but her clinical picture is that of prerenal etiology.  We will control her symptoms of nausea and vomiting with IV antiemetics.  I have spoken with her interventional radiologist, Dr. Marky Thapa, at [facility] who has been managing the biliary tube.  He is going to discuss this with her surgical oncologist and will get back to me on any additional recommendations regarding her biliary output and management of the tube itself.  He states there was concern for a biliary anastomosis as result of her surgery in 2014.  We'll also ask the [facility] group to follow her during her stay for any oncology needs.  We will work to correct her renal function with fluids and try to identify a solution to her recurrent dehydration.  Additional plans based on her clinical course.    I discussed the patients findings and my recommendations with patient and family    Jesse Lim MD  01/23/17  9:19 AM

## 2017-01-23 NOTE — CONSULTS
Adult Nutrition  Assessment/PES    Patient Name:  Jacoby Ramírez  YOB: 1955  MRN: [id number]  Admit Date:  1/22/2017    Assessment Date:  1/23/2017     Consult received and nutritional assessment completed. Pt currently receiving a FLD and intakes ~ 25% at this time. Will arrange Ensure puddings BID and Ensure Enlive once daily. RD to follow/monitor. Thanks.           Reason for Assessment       01/23/17 1615    Reason for Assessment    Reason For Assessment/Visit admission assessment    Identified At Risk By Screening Criteria MST SCORE 2+;unintentional loss of 10 lbs or more in the past 2 mos;diagnosis    Diagnosis Diagnosis    Endocrine DM    Fluid Status Dehydration    Metabolic/ICU Hyponatremia    Oncology Cancer w/mets;Other (comment)   Gallbladder cancer with Palliative chemo    Renal SEBASTIAN                Anthropometrics       01/23/17 1618    Anthropometrics    RD Documented Current Weight  101 kg (223 lb)    RD Documented Weight on Admission 101 kg (223 lb)    Anthropometrics (Special Considerations)    RD Calculated     RD Calculated %     RD Calculated BMI (kg/m2) 41    Usual Body Weight (UBW)    Weight Loss 15.4 kg (34 lb)    Weight Loss Time Frame since ~ 2014 and cancer diagnosis.     Body Mass Index (BMI)    BMI Grade greater than 40 - obesity grade III            Labs/Tests/Procedures/Meds       01/23/17 1619    Labs/Tests/Procedures/Meds    Diagnostic Test/Procedure Review reviewed    Labs/Tests Review Reviewed;Na+;Glucose;Chol;Creat;Platlets;Mg++;ALT    Medication Review Reviewed, pertinent;Insulin;Laxative    Significant Vitals reviewed            Physical Findings       01/23/17 1619    Physical Findings/Assessment    Additional Documentation Physical Appearance (Group)    Physical Appearance    Overall Physical Appearance obese    Gastrointestinal nausea;vomiting;other (see comments)   Abdominal cramping    Skin edema;other (see comments)   B = 20, trace edema noted.  "            Estimated/Assessed Needs       01/23/17 1620    Calculation Measurements    Weight Used For Calculations 101 kg (223 lb)    Height Used for Calculations 1.575 m (5' 2\")    Estimated/Assessed Energy Needs    Energy Need Method [facility]    Age 61    RMR ([facility] Equation) 1529.77    Activity Factors ([facility])  Confined to bed  1.2    Total estimated needs ([facility]) 1836    Estimated/Assessed Protein Needs    Weight Used for Protein Calculation 101 kg (223 lb)    Protein (gm/kg) 1.0    1.0 Gm Protein (gm) 101.15    Estimated/Assessed Fluid Needs    Fluid Need Method --   20ml/kg    RDA Method (mL)  2020            Nutrition Prescription Ordered       01/23/17 1621    Nutrition Prescription PO    Current PO Diet Full Liquid            Evaluation of Received Nutrient/Fluid Intake       01/23/17 1621    Evaluation of Received Nutrient/Fluid Intake    Nutrition Delivered Fluid Evaluation    Fluid Intake Evaluation    IV Fluid (mL) 3000   NS @125ml/hr    Total Fluid Intake (mL) 3000    PO Evaluation    Number of Meals 2    % PO Intake 25%              Problem/Interventions:        Problem 1       01/23/17 1622    Nutrition Diagnoses Problem 1    Problem 1 Nutrition Appropriate for Condition at this Time    Etiology (related to) MNT for Treatment/Condition    Signs/Symptoms (evidenced by) Report of Mnimal PO Intake;Report/Observation    Reported/Observed By Family;RN    Appetite Early satiety;Poor secondary to oral/GI factor;Poor whenever ill;Poor at this time                    Intervention Goal       01/23/17 1623    Intervention Goal    General Maintain nutrition;Improved nutrition related lab(s);Reduce/improve symptoms    PO Tolerate PO;Increase intake;Advance diet;PO intake (%)    PO Intake % 50 %   %    Weight No significant weight loss            Nutrition Intervention       01/23/17 1623    Nutrition Intervention    RD/Tech Action Follow Tx progress;Care plan " reviewd;Encourage intake;Recommend/ordered    Recommended/Ordered Supplement            Nutrition Prescription       01/23/17 1623    Nutrition Prescription PO    PO Prescription Begin/change supplement    Supplement Ensure Pudding;Ensure    Supplement Frequency 2 times a day    New PO Prescription Ordered? Yes            Education/Evaluation       01/23/17 1624    Education    Education Will Instruct as appropriate    Monitor/Evaluation    Monitor Per protocol    Education Follow-up Reinforce PRN        Comments:      Electronically signed by:  Ewa Cain RD  01/23/17 4:25 PM

## 2017-01-23 NOTE — ED PROVIDER NOTES
EMERGENCY DEPARTMENT ENCOUNTER    CHIEF COMPLAINT  Chief Complaint: Spasms in hands/feet  History given by: pt, family  History limited by: none  Room Number: 14/14  PMD: Dinorah Maria MD      HPI:  Pt is a 61 y.o. female who presents complaining of cramping in bilateral hands and feet that traveled up her legs. She also c/o light-headedness, nausea, vomiting and SOB with exertion. Family reports 2 episodes of syncope tonight, the first episode being around 1900. She also reports a couple syncopal episodes 2 or 3 weeks ago.     Duration:  Few hours  Onset: sudden  Timing: intermittent  Location: bilateral hands/feet  Radiation: up her legs  Quality: cramping  Intensity/Severity: moderate  Progression: waxing and waning  Associated Symptoms:  light-headedness, nausea, vomiting and SOB with exertion  Aggravating Factors: none  Alleviating Factors: none  Previous Episodes: couple similar syncopal episodes 2-3 weeks ago  Treatment before arrival: none    PAST MEDICAL HISTORY  Active Ambulatory Problems     Diagnosis Date Noted   • Vitamin D deficiency 05/02/2016   • Morbid obesity 05/02/2016   • Fatigue 05/02/2016   • Abnormal liver function tests 05/02/2016   • Dyslipidemia 05/02/2016   • Uncontrolled type 2 diabetes mellitus 05/02/2016   • Benign essential hypertension 05/02/2016   • Dehydration 10/07/2015   • Malignant neoplasm of gallbladder 01/22/2015   • Carcinoma of gallbladder 09/29/2015   • Hyperglycemia 10/07/2015   • Jaundice 09/14/2015   • Nutritional disorder 10/07/2015   • Abnormal weight loss 10/07/2015   • Diabetes type 2, controlled 06/17/2016   • Tremor of unknown origin 06/17/2016   • Abdominal pain, left upper quadrant 12/12/2016   • Ascites, malignant 01/03/2017   • Non-intractable vomiting with nausea 01/03/2017   • Cancer associated pain 01/03/2017   • Acute kidney injury 01/03/2017   • Leukocytosis 01/03/2017     Resolved Ambulatory Problems     Diagnosis Date Noted   • No Resolved  Ambulatory Problems     Past Medical History   Diagnosis Date   • Anemia    • Anxiety    • Breast cyst    • Cancer-related pain    • Cervicalgia    • Depression    • Gallbladder cancer    • H/O abnormal mammogram 2013   • H/O Acute renal failure    • H/O Acute renal injury    • H/O Elevated hemoglobin    • H/O Elevated liver enzymes    • H/O fatigue    • H/O hyperglycemia    • H/O leukocytosis    • H/O Malignant ascites    • H/O nausea and vomiting    • H/O splenomegaly    • History of weight loss    • Hx of being hospitalized    • Hyperlipidemia    • Hypertension    • Mood disorder    • Pain in limb    • SOB (shortness of breath) on exertion    • Type 2 diabetes mellitus        PAST SURGICAL HISTORY  Past Surgical History   Procedure Laterality Date   • Liver surgery     • Pap smear N/A 2010   • Gallbladder surgery  2015     Hepatectomy of segments 4B and 5, resection of intrahepatic bile duct, resection of extrahepatic bile duct with taking down of previous Meeta-en-Y, resection of small bowel, intrahepatic hepaticojejunostomy x2 and periportal lymph node dissection on 2015. Pathology showed no residual cancer.   • Paracentesis  12/15/2016     Ascites fluids, pathology evaluation positive for adenocarcinoma.    •  section  1979   • Cholecystectomy  2014     and exploratory laparotomy with choledochojejunostomy by Dr. Duncan Barker.    • Venous access device (port) insertion       PowerPort by Dr. Barker.       FAMILY HISTORY  Family History   Problem Relation Age of Onset   • Other Mother      Irregular heart beats   • Heart disease Mother    • Heart attack Father    • Heart disease Father    • No Known Problems Sister    • Cholelithiasis Brother    • Hypertension Maternal Grandmother    • Hypertension Maternal Grandfather    • Diabetes Maternal Grandfather        SOCIAL HISTORY  Social History     Social History   • Marital status:      Spouse name: N/A   • Number  of children: 1   • Years of education: N/A     Occupational History   •  of administration  [employer]     Social History Main Topics   • Smoking status: Never Smoker   • Smokeless tobacco: Never Used   • Alcohol use No   • Drug use: No   • Sexual activity: No     Other Topics Concern   • Not on file     Social History Narrative       ALLERGIES  Metformin and related    REVIEW OF SYSTEMS  Review of Systems   Constitutional: Negative for fever.   HENT: Negative for sore throat.    Eyes: Negative.    Respiratory: Positive for shortness of breath ( with exertion). Negative for cough.    Cardiovascular: Negative for chest pain.   Gastrointestinal: Positive for nausea and vomiting. Negative for abdominal pain and diarrhea.   Genitourinary: Negative for dysuria.   Musculoskeletal: Negative for neck pain.        Spasms/cramping in bilateral hands and feet   Skin: Negative for rash.   Allergic/Immunologic: Negative.    Neurological: Positive for syncope and light-headedness. Negative for weakness, numbness and headaches.   Hematological: Negative.    Psychiatric/Behavioral: Negative.    All other systems reviewed and are negative.      PHYSICAL EXAM  ED Triage Vitals   Temp Heart Rate Resp BP SpO2   01/22/17 2122 01/22/17 2122 01/22/17 2122 01/22/17 2138 01/22/17 2122   97.7 °F (36.5 °C) 114 16 127/71 92 %      Temp src Heart Rate Source Patient Position BP Location FiO2 (%)   01/22/17 2122 01/22/17 2122 01/22/17 2138 01/22/17 2138 --   Tympanic Monitor Sitting Right arm        Physical Exam   Constitutional: She is oriented to person, place, and time and well-developed, well-nourished, and in no distress. No distress.   HENT:   Head: Normocephalic and atraumatic.   Eyes: EOM are normal. Pupils are equal, round, and reactive to light.   Neck: Normal range of motion. Neck supple.   Cardiovascular: Regular rhythm and normal heart sounds.  Tachycardia present.    Pulmonary/Chest: Effort normal and breath  sounds normal. No respiratory distress.   Abdominal: Soft. There is tenderness ( diffuse). There is no rebound and no guarding.   Biliary drain in place with moderate output   Musculoskeletal: Normal range of motion. She exhibits no edema.   Extremities without deformity or spasm at present time   Neurological: She is alert and oriented to person, place, and time. She has normal sensation and normal strength.   Skin: Skin is warm and dry. No rash noted.   Psychiatric: Mood and affect normal.   Nursing note and vitals reviewed.      LAB RESULTS  Lab Results (last 24 hours)     Procedure Component Value Units Date/Time    CBC & Differential [[id number]] Collected:  01/22/17 2338    Specimen:  Blood Updated:  01/23/17 0002    Narrative:       The following orders were created for panel order CBC & Differential.  Procedure                               Abnormality         Status                     ---------                               -----------         ------                     CBC Auto Differential[[id number]]         Abnormal            Final result                 Please view results for these tests on the individual orders.    Comprehensive Metabolic Panel [[id number]]  (Abnormal) Collected:  01/22/17 2338    Specimen:  Blood Updated:  01/23/17 0026     Glucose 264 (H) mg/dL      BUN 68 (H) mg/dL      Creatinine 3.42 (H) mg/dL      Sodium 122 (L) mmol/L      Potassium 4.2 mmol/L      Chloride 79 (L) mmol/L      CO2 22.4 mmol/L      Calcium 10.0 mg/dL      Total Protein 8.6 (H) g/dL      Albumin 3.80 g/dL      ALT (SGPT) 47 (H) U/L      AST (SGOT) 33 (H) U/L      Alkaline Phosphatase 198 (H) U/L      Total Bilirubin 1.0 mg/dL      eGFR Non African Amer 14 (L) mL/min/1.73      Globulin 4.8 gm/dL      A/G Ratio 0.8 g/dL      BUN/Creatinine Ratio 19.9      Anion Gap 20.6 mmol/L     Troponin [[id number]]  (Normal) Collected:  01/22/17 2338    Specimen:  Blood Updated:  01/23/17 0023     Troponin T <0.010 ng/mL      Narrative:       Troponin T Reference Ranges:  Less than 0.03 ng/mL:    Negative for AMI  0.03 to 0.09 ng/mL:      Indeterminant for AMI  Greater than 0.09 ng/mL: Positive for AMI    CBC Auto Differential [[id number]]  (Abnormal) Collected:  01/22/17 2338    Specimen:  Blood Updated:  01/23/17 0002     WBC 3.86 (L) 10*3/mm3      RBC 4.93 10*6/mm3      Hemoglobin 14.2 g/dL      Hematocrit 40.3 %      MCV 81.7 fL      MCH 28.8 pg      MCHC 35.2 g/dL      RDW 15.2 (H) %      RDW-SD 43.7 fl      MPV 9.6 fL      Platelets 82 (L) 10*3/mm3      Neutrophil % 69.4 %      Lymphocyte % 27.2 %      Monocyte % 2.1 (L) %      Eosinophil % 0.5 %      Basophil % 0.0 %      Immature Grans % 0.8 (H) %      Neutrophils, Absolute 2.68 10*3/mm3      Lymphocytes, Absolute 1.05 10*3/mm3      Monocytes, Absolute 0.08 (L) 10*3/mm3      Eosinophils, Absolute 0.02 10*3/mm3      Basophils, Absolute 0.00 10*3/mm3      Immature Grans, Absolute 0.03 10*3/mm3     Magnesium [[id number]]  (Abnormal) Collected:  01/22/17 2338    Specimen:  Blood Updated:  01/23/17 0023     Magnesium 2.7 (H) mg/dL     Lactic Acid, Plasma [[id number]]  (Abnormal) Collected:  01/22/17 2338    Specimen:  Blood Updated:  01/23/17 0016     Lactate 2.8 (C) mmol/L     CK [[id number]]  (Normal) Collected:  01/22/17 2338    Specimen:  Blood Updated:  01/23/17 0023     Creatine Kinase 20 U/L     Blood Gas, Arterial [[id number]]  (Abnormal) Collected:  01/23/17 0018    Specimen:  Arterial Blood Updated:  01/23/17 0020     Site Arterial: right brachial      Felipe's Test Positive      pH, Arterial 7.448 pH units      pCO2, Arterial 30.4 (L) mm Hg      pO2, Arterial 97.5 mm Hg      HCO3, Arterial 21.0 (L) mmol/L      Base Excess, Arterial -1.9 (L) mmol/L      O2 Saturation Calculated 98.0 %      Barometric Pressure for Blood Gas 734.3 mmHg      Modality Room air      Rate 20 Breaths/minute     Narrative:       sats 97% Meter: [id number] : [id number] Kelly Hollis I  ordered the above labs and reviewed the results    RADIOLOGY  No orders to display        PROCEDURES  Procedures  EKG           EKG time: 2250  Rhythm/Rate: Sinus Tachycardia, 104  P waves and MD: nml  QRS, axis: no conduction delays   ST and T waves: no acute ST wave changes     Interpreted Contemporaneously by me, independently viewed  unchanged compared to prior 10/9/14      PROGRESS AND CONSULTS  ED Course     2236  Ordered labs and EKG for further evaluation.     2332  Ordered UA for further evaluation. Ordered Phenergan for nausea.     0039  Placed a call to Hematology and Oncology for consult.     0040  Rechecked pt. Discussed renal failure, low sodium levels and plan to admit pt. Pt and family understand and agree with plan. All questions addressed.     0044  Discussed case with Dr Hodges  Reviewed history, exam, results and treatments.  Discussed concerns and plan of care. Dr Hodges agrees to consult for any oncology care and asks for [facility] to admit.     0100  Discussed case with Dr Zurita  Reviewed history, exam, results and treatments.  Discussed concerns and plan of care. Dr Zurita accepts pt to be admitted to Telemetry.      MEDICAL DECISION MAKING  Results were reviewed/discussed with the patient and they were also made aware of online access. Pt also made aware that some labs, such as cultures, will not be resulted during ER visit and follow up with PMD is necessary.     MDM  Number of Diagnoses or Management Options  Acute renal failure, unspecified acute renal failure type:   Hyponatremia:      Amount and/or Complexity of Data Reviewed  Clinical lab tests: reviewed and ordered (Blood work:   BUN 68 (H) mg/dL      Creatinine 3.42 (H) mg/dL      Sodium 122 (L) mmol/L      WBC 3.86 (L) 10*3/mm3      Lactate 2.8 (C) mmol/L)  Decide to obtain previous medical records or to obtain history from someone other than the patient: yes  Discuss the patient with other providers: yes (Dr. Hodges (CBC) and  Dr. Zurita ([facility]))           DIAGNOSIS  Final diagnoses:   Acute renal failure, unspecified acute renal failure type   Hyponatremia       DISPOSITION  ADMISSION    Discussed treatment plan and reason for admission with pt/family and admitting physician.  Pt/family voiced understanding of the plan for admission for further testing/treatment as needed.         Latest Documented Vital Signs:  As of 1:02 AM  BP- 99/73 HR- 99 Temp- 97.7 °F (36.5 °C) (Tympanic) O2 sat- 96%    --  Documentation assistance provided by yenny Dao for Dr. Antonio Boo.  Information recorded by the scribe was done at my direction and has been verified and validated by me.            Jory Dao  01/23/17 0102       Antonio Boo MD  01/23/17 0404

## 2017-01-23 NOTE — PLAN OF CARE
Problem: Patient Care Overview (Adult)  Goal: Plan of Care Review  Outcome: Ongoing (interventions implemented as appropriate)    01/23/17 0521   Coping/Psychosocial Response Interventions   Plan Of Care Reviewed With patient   Patient Care Overview   Progress no change   Outcome Evaluation   Outcome Summary/Follow up Plan Pt. VS WNL. Hyponatremic at present. C/o generalized pain 3/10. C/o unrelieved nausea. Pt. resting in bed will give nausea meds and continue to monitor closely.       Goal: Adult Individualization and Mutuality  Outcome: Ongoing (interventions implemented as appropriate)  Goal: Discharge Needs Assessment  Outcome: Ongoing (interventions implemented as appropriate)    Problem: Fluid Volume Deficit (Adult)  Goal: Identify Related Risk Factors and Signs and Symptoms  Outcome: Outcome(s) achieved Date Met:  01/23/17  Goal: Fluid/Electrolyte Balance  Outcome: Ongoing (interventions implemented as appropriate)  Goal: Comfort/Well Being  Outcome: Ongoing (interventions implemented as appropriate)    Problem: Fall Risk (Adult)  Goal: Identify Related Risk Factors and Signs and Symptoms  Outcome: Outcome(s) achieved Date Met:  01/23/17  Goal: Absence of Falls  Outcome: Ongoing (interventions implemented as appropriate)

## 2017-01-23 NOTE — PROGRESS NOTES
Discharge Planning Assessment  [facility]     Patient Name: Jacoby Ramírez  MRN: [id number]  Today's Date: 1/23/2017    Admit Date: 1/22/2017          Discharge Needs Assessment       01/23/17 1205    Living Environment    Lives With parent(s)   Mother lives with patient    Living Arrangements house   No steps or stairs    Provides Primary Care For no one    Quality Of Family Relationships supportive    Able to Return to Prior Living Arrangements yes    Discharge Needs Assessment    Concerns To Be Addressed other (see comments)    Biliary Drain concerns    Readmission Within The Last 30 Days current reason for admission unrelated to previous admission    Anticipated Changes Related to Illness none    Equipment Currently Used at Home glucometer;bath bench    Equipment Needed After Discharge none    Transportation Available car;family or friend will provide    Discharge Disposition home healthcare service            Discharge Plan       01/23/17 1205    Case Management/Social Work Plan    Plan Return home with her mother and [facility] to follow    Patient/Family In Agreement With Plan yes    Additional Comments Introduced self and explained role of CCP and facesheet verified with patient at bedside.  Patient states she is normally independent with ADLs at home and only uses a bath bench and glucometer she has at home and denies need for any equipment at discharge.  Patient does ask that referral be made to [facility] to follow her at home due to her biliary drain she has currently.  Spoke with Lola at [facility] at ext. [phone] for referral.  Patient states she uses [facility] on [facility] [place] [place] and [place], [place] and denies any issues with affording or obtaining medications.  CCP will continue to follow....Shilpi Anthony RN,CCP         Discharge Placement     Facility/Agency Request Status Selected? Address Phone Number Fax Number    [facility] Accepted    Yes [phone]  [address] [place] [id number] [phone]                Demographic Summary       01/23/17 1204    Referral Information    Admission Type inpatient    Arrived From home or self-care    Contact Information    Permission Granted to Share Information With family/designee   Jenn Reynaga(daughter)  [phone]    Primary Care Physician Information    Name Dinorah Maria MD            Functional Status       01/23/17 1204    Functional Status Current    Ambulation 2-->assistive person    Transferring 2-->assistive person    Toileting 2-->assistive person    Bathing 2-->assistive person    Dressing 0-->independent    Eating 0-->independent    Communication 0-->understands/communicates without difficulty    Change in Functional Status Since Onset of Current Illness/Injury yes    Functional Status Prior    Ambulation 0-->independent    Transferring 0-->independent    Toileting 0-->independent    Bathing 0-->independent    Dressing 0-->independent    Eating 0-->independent    Communication 0-->understands/communicates without difficulty    IADL    Medications independent    Meal Preparation independent    Housekeeping independent    Laundry independent    Shopping independent    Oral Care independent    Activity Tolerance    Current Activity Limitations none    Usual Activity Tolerance moderate    Current Activity Tolerance fair    Cognitive/Perceptual/Developmental    Current Mental Status/Cognitive Functioning no deficits noted    Recent Changes in Mental Status/Cognitive Functioning no changes    Developmental Stage (Eriksson's Stages of Development) Stage 7 (35-65 years/Middle Adulthood) Generativity vs. Stagnation            Psychosocial     None            Abuse/Neglect     None            Legal     None            Substance Abuse     None            Patient Forms     None          Shilpi Anthony RN

## 2017-01-23 NOTE — PROGRESS NOTES
[facility] INPATIENT PROGRESS NOTE    Length of Stay:  0 days    CHIEF COMPLAINT/REASON FOR VISIT:  1.  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  Nausea/vomiting  3.  Muscle cramps  4.  Syncope  5.  Increasing drainage from biliary tube.      SUBJECTIVE:    Admitted overnight with increasing drainage from her biliary tube as well as syncope and nausea and vomiting.  She has been receiving palliative chemotherapy in our office at the [facility] with Gemzar, last on 1/17, with frequent IV fluids and labs and plans to add cisplatin when her renal function improves.  Increasing biliary tube output since biliary tube replaced at [facility] last week.  Muscle cramping in her arms and legs.  Dehydration and pain ultimately led to syncope.      Nausea and cramping better today.  Feeling weak.  Afebrile.       OBJECTIVE:  Vitals:    01/23/17 0233 01/23/17 0417 01/23/17 0758 01/23/17 1355   BP: 105/74 113/71 111/69 108/73   BP Location:  Left arm Left arm Left arm   Patient Position:  Sitting Lying Lying   Pulse: 98 92 95 95   Resp: 16 16 16 16   Temp:  97.5 °F (36.4 °C) 97.7 °F (36.5 °C) 97.8 °F (36.6 °C)   TempSrc:  Oral Oral Oral   SpO2: 99% 98% 98% 99%   Weight:       Height:             PHYSICAL EXAMINATION:  General:  Weak appearing but in NAD  Chest/Lungs:  CTAB no w/r/r  Heart:  RRR no m/r/g  Abdomen/GI:  Soft, NT, ND, NABS, biliary tube in the RUQ without erythema  Extremities:  WWP, trace edema    DIAGNOSTIC DATA:  Lab Results   Component Value Date    WBC 3.86 (L) 01/22/2017    HGB 14.2 01/22/2017    HCT 40.3 01/22/2017    MCV 81.7 01/22/2017    PLT 82 (L) 01/22/2017       Lab Results   Component Value Date    GLUCOSE 264 (H) 01/22/2017    BUN 68 (H) 01/22/2017    CREATININE 3.42 (H) 01/22/2017    EGFRIFNONA 14 (L) 01/22/2017    EGFRIFAFRI  09/12/2016      Comment:      <15 Indicative of kidney failure.    BCR 19.9 01/22/2017    CO2 22.4 01/22/2017     "CALCIUM 10.0 01/22/2017    PROTENTOTREF 7.3 01/08/2016    ALBUMIN 3.80 01/22/2017    LABIL2 0.8 01/22/2017    AST 33 (H) 01/22/2017    ALT 47 (H) 01/22/2017     U/a:  Trace bacteria, 6-12 WBCs, small LE, nitrite negative, 1.022  Mg 2.7    IMAGING:      Renal u/s:  FINDINGS: The right kidney is 10 cm in length and normal.      The left kidney is 10.4 cm in length and normal.      No obstructive uropathy on the right or left. Bladder collapsed. Some  free intraperitoneal fluid is noted incidentally. The remainder is  unremarkable.    Assessment/Plan   ASSESSMENT/PLAN:  This is a 61 y.o. female with:     1.  Metastatic gallbladder cancer:  Currently receiving palliative chemotherapy with Gemzar with plans to add cisplatin when her renal function improves.  Last dose of Gemzar was on 1/17.      She has history of Gallbladder cancer, status post cholecystectomy in 2014, stage III (TcN0M0) with positive margin. The patient finished undergoing \"neoadjuvant” chemotherapy with 3 cycles cisplatin and Gemzar by end of December 2014 and will by surgical resection by Dr. Jesse Galeano at the [facility] in January 2015.       Patient developed malignant ascites fluid, PET scan was obtained that showed metastatic disease with hypermetabolic carcinomatosis with omental caking. It is also hypermetabolic activity within the liver which is unclear etiology as there is no intrahepatic mass seen on CT scan. Concerning for intrahepatic metastasis. She has no evidence for metastasis within the chest or neck.      2.  Increased output from biliary tube:  Managed at [facility] by Dr. Negro.  Primary team has spoken with him.  Unclear what our radiologists here can do.        3.  Acute kidney injury:  Creatinine 3.42, up from 1.45.  If not improving with IVF will need to consult nephrology.  No obstruction on u/s.    4.  Nausea and vomiting:  Improved.  She is on Sancuso which was placed last Friday.  Phenergan and " compazine prn.  No Zofran as Sancuso replaces this.    5.  Diabetes:  On insulin  6.  Hyponatremia:  Worsening.  Na 122 today.  Likely hypovolemic.  SIADH possible too.  Nephrology consult if not improving with fluids.     7.  Leukopenia:  Likely due to chemotherapy.  Not neutropenic.    8.  Cancer related pain and muscle cramps:    9.  Code status:  Discussed with her.  Full code for now.      Sy Villagomez MD

## 2017-01-23 NOTE — TELEPHONE ENCOUNTER
----- Message from Ben Navarro sent at 1/23/2017  1:25 PM EST -----  Contact: Lola with [facility]  Lola ross would like verbal orders for home health for pt. Please advise.     #[phone]

## 2017-01-24 PROBLEM — N17.9 ACUTE RENAL FAILURE (HCC): Status: RESOLVED | Noted: 2017-01-01 | Resolved: 2017-01-01

## 2017-01-24 PROBLEM — R11.2 NON-INTRACTABLE VOMITING WITH NAUSEA: Status: RESOLVED | Noted: 2017-01-01 | Resolved: 2017-01-01

## 2017-01-24 NOTE — PLAN OF CARE
Problem: Patient Care Overview (Adult)  Goal: Plan of Care Review  Outcome: Ongoing (interventions implemented as appropriate)    01/24/17 0753 01/24/17 1116   Coping/Psychosocial Response Interventions   Plan Of Care Reviewed With patient --    Outcome Evaluation   Outcome Summary/Follow up Plan --  C/o constipation, refuses miralax because se thinks it'll make her nauseous, stool softeners given, will continue to monitor       Goal: Adult Individualization and Mutuality  Outcome: Ongoing (interventions implemented as appropriate)  Goal: Discharge Needs Assessment  Outcome: Ongoing (interventions implemented as appropriate)    Problem: Fluid Volume Deficit (Adult)  Goal: Fluid/Electrolyte Balance  Outcome: Ongoing (interventions implemented as appropriate)  Goal: Comfort/Well Being  Outcome: Ongoing (interventions implemented as appropriate)    Problem: Fall Risk (Adult)  Goal: Absence of Falls  Outcome: Ongoing (interventions implemented as appropriate)

## 2017-01-24 NOTE — PLAN OF CARE
Problem: Patient Care Overview (Adult)  Goal: Plan of Care Review  Outcome: Ongoing (interventions implemented as appropriate)    01/24/17 0055   Coping/Psychosocial Response Interventions   Plan Of Care Reviewed With patient   Patient Care Overview   Progress improving   Outcome Evaluation   Outcome Summary/Follow up Plan Pt. VS WNL. No c/o of pain or nausea this evening. Pt. states she is feeling better. Pt. resting comfortably, will continue to monitor closely.       Goal: Adult Individualization and Mutuality  Outcome: Ongoing (interventions implemented as appropriate)  Goal: Discharge Needs Assessment  Outcome: Ongoing (interventions implemented as appropriate)    Problem: Fluid Volume Deficit (Adult)  Goal: Fluid/Electrolyte Balance  Outcome: Ongoing (interventions implemented as appropriate)  Goal: Comfort/Well Being  Outcome: Ongoing (interventions implemented as appropriate)    Problem: Fall Risk (Adult)  Goal: Absence of Falls  Outcome: Ongoing (interventions implemented as appropriate)

## 2017-01-24 NOTE — TELEPHONE ENCOUNTER
Call made to [facility] and verbal orders were given to Elijah. MsRandi Desiree was just d/c today from the hospital.

## 2017-01-24 NOTE — DISCHARGE SUMMARY
Date of Admission: 1/22/2017  Date of Discharge:  1/24/2017  Primary Care Physician: Dinorah Maria MD     Discharge Diagnosis:  Principal Problem:    Acute renal failure  Active Problems:    Uncontrolled type 2 diabetes mellitus    Dehydration    Carcinoma of gallbladder    Non-intractable vomiting with nausea      DETAILS OF HOSPITAL STAY     Pertinent Test Results and Procedures Performed    Bilateral renal ultrasound showed no evidence of obstruction    Hospital Course    This is a 61-year-old white female who presented to the to the hospital with nausea, vomiting, dehydration, and increased output from her .  Please see H&P for full details of admission.  This has been an ongoing issue and she is currently undergoing chemotherapy under the direction of Dr. Sullivan.  She was admitted and given antiemetics and IV fluids.  She had acute renal failure upon presentation with a creatinine of 2.4 and a sodium level of 122.  With IV fluids her creatinine has corrected to 1.5 and her sodium level is 127.  These numbers are now back to what her preadmission baseline was.  Her nausea and vomiting has completely resolved.  She is feeling much better today and her biliary drainage has decreased.  I did discuss the case with her interventional radiologist, Dr. Marky Thapa, who has been managing her biliary tube.  The tube had just been replaced 4 days prior to presentation.  He did not feel there is any other mechanical means that could help her biliary output.  The patient is feeling well enough for discharge today.  She has been instructed to ensure that she has adequate oral intake to exceed that of her biliary output to prevent further dehydration in the future.  She will follow-up with Dr. Sullivan of the [facility] group next week as previously scheduled.  I've also instructed her to return to [facility] to see Dr. Thapa or her surgeon Dr. Galeano should she have ongoing issues with the biliary tube.  At this point  she is medically stable and will be released today.    Consults:   Consults     Date and Time Order Name Status Description    1/23/2017 0909 Inpatient Consult to Hematology & Oncology      1/23/2017 0039 Hematology and Oncology (on-call MD unless specified) Completed             Condition on Discharge: Stable    Discharge Disposition  Home or Self Care    Discharge Medications   Jacoby Ramírez   Home Medication Instructions CHICO:[id number]    Printed on:01/24/17 6688   Medication Information                      acetaminophen (TYLENOL) 325 MG tablet  Take 650 mg by mouth Every 6 (Six) Hours As Needed for mild pain (1-3).             Alogliptin-Pioglitazone 25-30 MG tablet  Take 1 each by mouth daily.             Dapagliflozin Propanediol (FARXIGA) 10 MG tablet  Take 1 tablet by mouth daily.             dexamethasone (DECADRON) 4 MG tablet  Take 1 tablet by mouth 2 (Two) Times a Day With Meals. 2 tabs twice a day, starting the night before Cisplatin, for 3 days.             ergocalciferol (ERGOCALCIFEROL) 88538 UNITS capsule  Take 2 capsules by mouth 1 (one) time per week.             escitalopram (LEXAPRO) 10 MG tablet  TAKE 1 TABLET BY MOUTH EVERY DAY             glucose blood test strip  1 each by Other route 3 (three) times a day.             hydrALAZINE (APRESOLINE) 25 MG tablet  Take 1 tablet by mouth Every 12 (Twelve) Hours.             Insulin Lispro 100 UNIT/ML solution cartridge  Inject  under the skin.             Insulin Pen Needle 32G X 4 MM misc  1 each 4 (four) times a day.             LANTUS SOLOSTAR 100 UNIT/ML injection pen  INJECT 70 UNITS UNDER THE SKIN DAILY             LORazepam (ATIVAN) 1 MG tablet  Take 1 tablet by mouth Every 8 (Eight) Hours As Needed for anxiety for up to 60 doses.             ondansetron (ZOFRAN) 8 MG tablet  Take 1 tablet by mouth Every 8 (Eight) Hours As Needed for nausea or vomiting.             oxyCODONE (ROXICODONE) 5 MG immediate release tablet  Take 1 tablet by  mouth Every 6 (Six) Hours As Needed for moderate pain (4-6).             phentermine 30 MG capsule  Take 1 capsule by mouth Every Morning.             prochlorperazine (COMPAZINE) 10 MG tablet  Take 1 tablet by mouth Every 6 (Six) Hours As Needed for nausea or vomiting for up to 60 doses.             promethazine (PHENERGAN) 25 MG suppository  Insert 1 suppository into the rectum Every 6 (Six) Hours As Needed for nausea or vomiting.             SANCUSO 3.1 MG/24HR               sennosides-docusate sodium (SENOKOT-S) 8.6-50 MG tablet  Take 1 tablet by mouth Daily.             sodium chloride 1 G tablet  Take 1 tablet by mouth 3 (Three) Times a Day.                 Discharge Diet:   Diet Instructions     Diet: Regular, Consistent Carbohydrate; Thin Liquids, No Restrictions       Discharge Diet:   Regular  Consistent Carbohydrate      Fluid Consistency:  Thin Liquids, No Restrictions                 Activity at Discharge:   Activity Instructions     Activity as Tolerated                     Follow-up Appointments  Future Appointments  Date Time Provider Department Center   1/31/2017 8:00 AM [facility]   1/31/2017 8:40 AM Vincenzo Sullivan MD PhD [facility] CBC [place]   1/31/2017 9:30 AM CHEMO INF 7 [facility] INFUS [facility]   3/14/2017 8:30 AM Dinorah Maria MD [facility] None     Referrals and Follow-ups to Schedule     Follow-Up    As directed    1.  With Dr. Sullivan next week as previously scheduled  2. With Junior Negro and Froylan at [place] as needed   Follow Up Details:  as listed                       I have examined and discussed discharge planning with the patient today.     Jesse Lim MD  01/24/17  1:55 PM    Time: Discharge 35 min

## 2017-01-24 NOTE — PROGRESS NOTES
[facility] GROUP INPATIENT PROGRESS NOTE    Length of Stay:  1 days    CHIEF COMPLAINT/REASON FOR VISIT:  1.  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  Nausea/vomiting  3.  Muscle cramps  4.  Syncope  5.  Increasing drainage from biliary tube.      SUBJECTIVE:  Feels much better today.  States that drainage from biliary tube has decreased.  Ambulatory.  Denies pain.  Did not complain of nausea today.        OBJECTIVE:  Vitals:    01/23/17 1355 01/23/17 2112 01/23/17 2342 01/24/17 0752   BP: 108/73 115/68 124/80 114/65   BP Location: Left arm Left arm Left arm Left arm   Patient Position: Lying Lying Lying Sitting   Pulse: 95 95 88 91   Resp: 16 18 18 16   Temp: 97.8 °F (36.6 °C) 98.2 °F (36.8 °C) 97.9 °F (36.6 °C) 98.3 °F (36.8 °C)   TempSrc: Oral Oral Oral Oral   SpO2: 99% 99% 98% 98%   Weight:       Height:             PHYSICAL EXAMINATION:  General:  Looks much better today, ambulatory  Chest/Lungs:  CTAB with mild crackles at the left base  Heart:  RRR no m/r/g  Abdomen/GI:  Soft, NT, ND, NABS, biliary tube in the RUQ without erythema  Extremities:  WWP, trace edema    DIAGNOSTIC DATA:  Lab Results   Component Value Date    WBC 3.43 (L) 01/24/2017    HGB 12.1 01/24/2017    HCT 35.2 (L) 01/24/2017    MCV 82.6 01/24/2017    PLT 48 (L) 01/24/2017       Lab Results   Component Value Date    GLUCOSE 142 (H) 01/24/2017    BUN 63 (H) 01/24/2017    CREATININE 1.50 (H) 01/24/2017    EGFRIFNONA 35 (L) 01/24/2017    EGFRIFAFRI  09/12/2016      Comment:      <15 Indicative of kidney failure.    BCR 42.0 (H) 01/24/2017    CO2 18.6 (L) 01/24/2017    CALCIUM 9.1 01/24/2017    PROTENTOTREF 7.3 01/08/2016    ALBUMIN 3.80 01/22/2017    LABIL2 0.8 01/22/2017    AST 33 (H) 01/22/2017    ALT 47 (H) 01/22/2017     U/a:  Trace bacteria, 6-12 WBCs, small LE, nitrite negative, 1.022  Mg 2.7    IMAGING:      Renal u/s:  FINDINGS: The right kidney is 10 cm in length and  "normal.      The left kidney is 10.4 cm in length and normal.      No obstructive uropathy on the right or left. Bladder collapsed. Some  free intraperitoneal fluid is noted incidentally. The remainder is  unremarkable.    Assessment/Plan   ASSESSMENT/PLAN:  This is a 61 y.o. female with:     1.  Metastatic gallbladder cancer:  Currently receiving palliative chemotherapy with Gemzar with plans to add cisplatin when her renal function improves.  Last dose of Gemzar was on 1/17 with plans to start her next cycle on 1/31.    She has history of Gallbladder cancer, status post cholecystectomy in 2014, stage III (TcN0M0) with positive margin. The patient finished undergoing \"neoadjuvant” chemotherapy with 3 cycles cisplatin and Gemzar by end of December 2014 and will by surgical resection by Dr. Jesse Galeano at the [facility] in January 2015.       Patient developed malignant ascites fluid, PET scan was obtained that showed metastatic disease with hypermetabolic carcinomatosis with omental caking. It is also hypermetabolic activity within the liver which is unclear etiology as there is no intrahepatic mass seen on CT scan. Concerning for intrahepatic metastasis. She has no evidence for metastasis within the chest or neck.      2.  Increased output from biliary tube:  Managed at [facility] by Dr. Negro.  Primary team has spoken with him.  She will likely need to go back there for further management    3.  Acute kidney injury:  Creatinine 3.42 on admission, up from 1.45.  Renal u/s no hydronephrosis. Urine eos negative.  Creatinine improved to 1.50 with hydration.  4.  Nausea and vomiting:  Improved.  She is on Sancuso which was placed last Friday.  Phenergan and compazine prn.  No Zofran as Sancuso replaces this.    5.  Diabetes:  On insulin  6.  Hyponatremia:  Worse on admission, now better at 127 today.  Likely hypovolemic.  7.  Leukopenia:  Likely due to chemotherapy.  Not neutropenic.    8.  " Thrombocytopenia:  Platelets 48k today, down from 82k. Likely due to chemotherapy.  Should improve over time.    9.  Cancer related pain and muscle cramps:  improved  10.  Code status:  Discussed with her.  Full code for now.     I advised her that it's critical that she takes in more fluids than her biliary tube puts out in order to stay hydrated.  From my standpoint she's OK to be discharged as long as she has a plan to f/u downtown to address her biliary tube.  Follow up as scheduled with Dr. Sullivan next week on 1/31 to start cycle 2 of chemotherapy.         Sy Villagomez MD

## 2017-01-25 NOTE — PROGRESS NOTES
Continued Stay Note  [facility]     Patient Name: Jacoby Ramírez  MRN: [id number]  Today's Date: 1/25/2017    Admit Date: 1/22/2017          Discharge Plan       01/25/17 0818    Case Management/Social Work Plan    Plan Home with mother and [facility] to follow    Final Note    Final Note Discharged 1/24/17 to home with [facility] to follow              Discharge Codes       01/25/17 0818    Discharge Codes    Discharge Codes 06  Discharged/transferred to home under care of organized home health service organization in anticipation of skilled care        Expected Discharge Date and Time     Expected Discharge Date Expected Discharge Time    Jan 24, 2017             Shilpi Anthony RN

## 2017-01-25 NOTE — TELEPHONE ENCOUNTER
Pt. Was admitted to the hospital on Sunday for dehydration.  Was d/c'd yesterday.  Has appt. To see dr. quinonez on 1/31/17 and to begin another treatment.  Wanted to make sure she wasn't suppose to come in for ivf's this wk.  Informed pt. She was hydrated while she was in the hospital.  Feels like she is drinking and doing better at this point.  Will call for any problems in the interim. Understanding noted.

## 2017-01-25 NOTE — TELEPHONE ENCOUNTER
----- Message from Eula Anthony sent at 1/25/2017 10:36 AM EST -----   Pt wants to know if she needs to come in for Fluids this week because she just got out of the hospital yesterday.  Keep Chemo appointment for next Week?      [phone]

## 2017-01-30 NOTE — TELEPHONE ENCOUNTER
Call made to Ms. Morgan per request from Dr. Maria to let her know that her strep test was negative and to call if any questions.

## 2017-01-30 NOTE — PROGRESS NOTES
"Subjective   Jacoby Ramírez is a 61 y.o. female here for   Chief Complaint   Patient presents with   • URI     Patient c/o drainage w/ a mild sore throat x 2 days. She hasn't taken any medication.    .    Vitals:    01/30/17 1502   BP: 100/80   BP Location: Left arm   Patient Position: Sitting   Cuff Size: Adult   Temp: 97.4 °F (36.3 °C)   TempSrc: Temporal Artery    Weight: 222 lb (101 kg)   Height: 62\" (157.5 cm)       Sore Throat    This is a new problem. The current episode started yesterday. The problem has been unchanged. There has been no fever. The pain is mild. Pertinent negatives include no congestion, coughing, ear pain, shortness of breath or trouble swallowing.        Encounter Diagnoses   Name Primary?   • Pharyngitis, unspecified etiology Yes   • Benign essential hypertension    • Carcinoma of gallbladder    • Controlled type 2 diabetes mellitus without complication, without long-term current use of insulin    • Dyslipidemia    • Controlled type 2 diabetes mellitus without complication, with long-term current use of insulin        The following portions of the patient's history were reviewed and updated as appropriate: allergies, current medications, past social history and problem list.    Review of Systems   Constitutional: Positive for fatigue. Negative for activity change, appetite change, chills and fever.   HENT: Positive for postnasal drip and sore throat. Negative for congestion, ear pain, sinus pressure, sneezing, trouble swallowing and voice change.    Respiratory: Negative for cough, shortness of breath and wheezing.    Cardiovascular: Negative for chest pain, palpitations and leg swelling.     Low BP - stopped bp medication.    Objective   Physical Exam   Constitutional: She appears well-developed and well-nourished. No distress.   HENT:   Head: Normocephalic.   Right Ear: External ear normal. Tympanic membrane is bulging. Tympanic membrane is not erythematous.   Left Ear: External ear " normal. Tympanic membrane is bulging. Tympanic membrane is not erythematous.   Nose: Right sinus exhibits no frontal sinus tenderness. Left sinus exhibits no frontal sinus tenderness.   Mouth/Throat: Oropharynx is clear and moist. No oropharyngeal exudate.   Neck: Normal range of motion. Neck supple.   Cardiovascular: Normal rate, regular rhythm and normal heart sounds.    Pulmonary/Chest: Effort normal and breath sounds normal. No respiratory distress. She has no wheezes. She has no rales. She exhibits no tenderness.   Musculoskeletal: She exhibits no edema.   Lymphadenopathy:     She has no cervical adenopathy.   Psychiatric: She has a normal mood and affect. Her behavior is normal.   Nursing note and vitals reviewed.      Assessment/Plan   Problem List Items Addressed This Visit        Unprioritized    Dyslipidemia    Benign essential hypertension    Carcinoma of gallbladder    Diabetes type 2, controlled    Relevant Medications    Insulin Glargine (LANTUS SOLOSTAR) 100 UNIT/ML injection pen    Insulin Lispro 100 UNIT/ML solution cartridge      Other Visit Diagnoses     Pharyngitis, unspecified etiology    -  Primary    Relevant Orders    POC Rapid Strep A (Completed)       Sore throat but negative for strep.  Need increased fluids, nasal saline,etc.   Chemo starting tomorrow for recurrent gallbladder CA.   HTN - now with low BP - need increased fluids.   DM - stable.  Call if sugar less than 100 or greater than 200.

## 2017-01-30 NOTE — MR AVS SNAPSHOT
Jacoby Ramírez   1/30/2017 3:00 PM   Office Visit    Dept Phone:  [phone]   Encounter #:  [id number]    Provider:  Dinorah Maria MD   Department:  [facility] INTERNAL MEDICINE                Your Full Care Plan              Today's Medication Changes          These changes are accurate as of: 1/30/17  3:51 PM.  If you have any questions, ask your nurse or doctor.               Medication(s)that have changed:     Insulin Glargine 100 UNIT/ML injection pen   Commonly known as:  LANTUS SOLOSTAR   Inject 30 Units under the skin Daily.   What changed:  See the new instructions.   Changed by:  Dinorah Maria MD       Insulin Lispro 100 UNIT/ML solution cartridge   Inject 20 Units under the skin 3 (Three) Times a Day Before Meals. Inject 20 units under the skin daily   What changed:  how much to take   Changed by:  Dinorah Maria MD         Stop taking medication(s)listed here:     Alogliptin Benzoate 25 MG tablet   Stopped by:  Dinorah Maria MD           hydrALAZINE 25 MG tablet   Commonly known as:  APRESOLINE   Stopped by:  Dinorah Maria MD           phentermine 30 MG capsule   Stopped by:  Dinorah Maria MD                Where to Get Your Medications      [facility] [address] [place] [address] [address] [place] [address] AT [facility] [place] [phone] [place] [address] [place] [phone]     Phone:  [phone]     Insulin Glargine 100 UNIT/ML injection pen    Insulin Lispro 100 UNIT/ML solution cartridge                  Your Updated Medication List          This list is accurate as of: 1/30/17  3:51 PM.  Always use your most recent med list.                acetaminophen 325 MG tablet   Commonly known as:  TYLENOL       Alogliptin-Pioglitazone 25-30 MG tablet   Take 1 each by mouth daily.       Dapagliflozin Propanediol 10 MG tablet   Commonly known as:  FARXIGA   Take 1  tablet by mouth daily.       dexamethasone 4 MG tablet   Commonly known as:  DECADRON   Take 1 tablet by mouth 2 (Two) Times a Day With Meals. 2 tabs twice a day, starting the night before Cisplatin, for 3 days.       ergocalciferol 47490 UNITS capsule   Commonly known as:  ERGOCALCIFEROL   Take 2 capsules by mouth 1 (one) time per week.       escitalopram 10 MG tablet   Commonly known as:  LEXAPRO   TAKE 1 TABLET BY MOUTH EVERY DAY       glucose blood test strip   1 each by Other route 3 (three) times a day.       Insulin Glargine 100 UNIT/ML injection pen   Commonly known as:  LANTUS SOLOSTAR   Inject 30 Units under the skin Daily.       Insulin Lispro 100 UNIT/ML solution cartridge   Inject 20 Units under the skin 3 (Three) Times a Day Before Meals. Inject 20 units under the skin daily       Insulin Pen Needle 32G X 4 MM misc   1 each 4 (four) times a day.       LORazepam 1 MG tablet   Commonly known as:  ATIVAN   Take 1 tablet by mouth Every 8 (Eight) Hours As Needed for anxiety for up to 60 doses.       ondansetron 8 MG tablet   Commonly known as:  ZOFRAN   Take 1 tablet by mouth Every 8 (Eight) Hours As Needed for nausea or vomiting.       oxyCODONE 5 MG immediate release tablet   Commonly known as:  ROXICODONE   Take 1 tablet by mouth Every 6 (Six) Hours As Needed for moderate pain (4-6).       prochlorperazine 10 MG tablet   Commonly known as:  COMPAZINE   Take 1 tablet by mouth Every 6 (Six) Hours As Needed for nausea or vomiting for up to 60 doses.       promethazine 25 MG suppository   Commonly known as:  PHENERGAN   Insert 1 suppository into the rectum Every 6 (Six) Hours As Needed for nausea or vomiting.       SANCUSO 3.1 MG/24HR   Generic drug:  granisetron       sennosides-docusate sodium 8.6-50 MG tablet   Commonly known as:  SENOKOT-S       sodium chloride 1 G tablet   Take 1 tablet by mouth 3 (Three) Times a Day.               We Performed the Following     POC Rapid Strep A       You Were  Diagnosed With        Codes Comments    Pharyngitis, unspecified etiology    -  Primary ICD-10-CM: J02.9  ICD-9-CM: 462     Benign essential hypertension     ICD-10-CM: I10  ICD-9-CM: 401.1     Carcinoma of gallbladder     ICD-10-CM: C23  ICD-9-CM: 156.0     Controlled type 2 diabetes mellitus without complication, without long-term current use of insulin     ICD-10-CM: E11.9  ICD-9-CM: 250.00     Dyslipidemia     ICD-10-CM: E78.5  ICD-9-CM: 272.4     Controlled type 2 diabetes mellitus without complication, with long-term current use of insulin     ICD-10-CM: E11.9, Z79.4  ICD-9-CM: 250.00, V58.67       Instructions     None    Patient Instructions History      Upcoming Appointments     Visit Type Date Time Department    OFFICE VISIT 1/30/2017  3:00 PM [facility]    FOLLOW UP 2 UNIT 1/31/2017  8:40 AM [facility] ONC [facility]    INFUSION 6 HR 1/31/2017  9:30 AM [facility] OP INFU CBC [facility]    PORT FLUSH 1/31/2017  8:00 AM [facility] OP INFU [facility]    FOLLOW UP 3/14/2017  8:30 AM [facility]      MyChart Signup     Our records indicate that you have declined [facility] signup. If you would like to sign up for [facility], please email [email or web address] or call [phone] to obtain an activation code.             Other Info from Your Visit           Your Appointments     Jan 31, 2017  8:00 AM EST   PORT FLUSH with INFU CBC [facility] PORT CHAIR   [facility] INFUSION CBC ([place])    [address] [place] [address] [phone]            Jan 31, 2017  8:40 AM EST   FOLLOW UP with Vincenzo Sullivan MD PhD   [facility] CBC GROUP: CONSULTANTS IN BLOOD DISORDERS AND CANCER ([facility])    [address] [address] [phone]            Jan 31, 2017  9:30 AM EST   INFUSION with CHEMO INF 7 CBC [facility] INFUSION CBC ([place])    [address] [address] [phone]            Mar 14,  "2017  8:30 AM EDT   Follow Up with Dinorah Maria MD   [facility] INTERNAL MEDICINE (--)    [address] [facility]. [address] [place] [address] [phone]           Arrive 15 minutes prior to appointment.              Allergies     Metformin And Related        Reason for Visit     URI Patient c/o drainage w/ a mild sore throat x 2 days. She hasn't taken any medication.       Vital Signs     Blood Pressure Temperature Height Weight    100/80 (BP Location: Left arm, Patient Position: Sitting, Cuff Size: Adult) 97.4 °F (36.3 °C) (Temporal Artery ) 62\" (157.5 cm) 222 lb (101 kg)    Breastfeeding? Body Mass Index Smoking Status       No 40.6 kg/m2 Never Smoker       Problems and Diagnoses Noted     Benign essential hypertension    Carcinoma of gallbladder    Type 2 diabetes mellitus, controlled    Dyslipidemia    Inflammation of throat    -  Primary        "

## 2017-01-31 PROBLEM — E87.6 HYPOKALEMIA: Status: ACTIVE | Noted: 2017-01-01

## 2017-01-31 PROBLEM — D69.59 CHEMOTHERAPY-INDUCED THROMBOCYTOPENIA: Status: ACTIVE | Noted: 2017-01-01

## 2017-01-31 PROBLEM — E83.42 HYPOMAGNESEMIA: Status: ACTIVE | Noted: 2017-01-01

## 2017-01-31 PROBLEM — E87.1 HYPONATREMIA SYNDROME: Status: ACTIVE | Noted: 2017-01-01

## 2017-01-31 PROBLEM — T45.1X5A CHEMOTHERAPY-INDUCED THROMBOCYTOPENIA: Status: ACTIVE | Noted: 2017-01-01

## 2017-01-31 NOTE — PROGRESS NOTES
PRE B/P 100/68, WEIGHT 229 6.4 LBS.   POST B/P 120/76 , WEIGHT 238.4 LBS.   TOTAL URINE OUTPUT 1025 CC  LUNGS CLEAR BILATERALLY , NO PEDAL EDEMA , NO C/O SOA  DISCUSSED WITH STEVEN BRISENO , PT TO D/C HOME , WILL RETURN HERE [facility] FOR IVFS AND REASSESSMENT   PT AND MOTHER INFORMED, V/U

## 2017-01-31 NOTE — PROGRESS NOTES
Per verbal order from Dr Sullivan patient to receive Cisplatin as a split dose given on Day 1 and Day 8. Patient to receive Normal Saline 1 liter on Days 2,3,9,and 10 and reduce Gemzar 15%.    CELESTE Smith requesting Aloxi be moved from Day 1 to Day 3 due to patient has a Sancuso patch on that will not be removed until Friday 2/3/17. Per Dr Sullivan gave a verbal order to move Aloxi.    Per CELESTE Smith per verbal order from Dr Sullivan added Ativan 0.5 mg IV to each treatment day for Nausea.  Per Dr Sullivan Patient to receive Magnesium 2 grams IV in Days 2 and Day 3 Normal saline 1 liter.

## 2017-01-31 NOTE — PROGRESS NOTES
1325, patient began to have nausea and vomiting.  Reviewed with dr quinonez.  Patient has sancuso patch on, since Friday( this will  this Thursday), therefore unable to use zofran, kytril, aloxi, orders for ativan 0.5mg.  Per dr quinonez, ok to add this to her remaining cycles of chemotherapy as patient requires this with each treatment.         Report given to CELESTE metz.  Patient stable at 1641.  Post hydration fluids infusing

## 2017-01-31 NOTE — PROGRESS NOTES
"REASONS FOR FOLLOWUP:    1. History of gallbladder status post-surgical resection with positive margins on 09/27/2014; stage III (T3N0M0).   Had \"neoadjuvant\" chemotherapy with 3 cycles of cisplatin and Gemzar by end of December 2014.  Patient had ventral hepatectomy of segments 4B and 5. Pathology evaluation showed no residual cancer.   2. Ascites fluids, paracentesis on 12/15/2016, pathology evaluation positive for adenocarcinoma. Laboratory study on 01/03/2017 showed elevated CA 19-9 at 449.9 units/ML, and slightly elevated CEA at 17.64 NG/ML.   3. PET scan on 01/05/2017 reported diffuse mesenteric carcinomatosis.   4. Patient was started on palliative chemotherapy with Gemzar on 01/10/2017.  Cisplatin was on hold due to renal injury.  5. Severe thrombocytopenia secondary to chemotherapy, Gemzar will be 15% of reduced from cycle #2.   6. Nausea vomiting dehydration and acute renal failure, required a brief hospitalization from January 22-24th 2017.       HISTORY OF PRESENT ILLNESS: Ms. Ramírez is a 61-year-old  female, pdresented for reevaluation on day of her scheduled chemotherapy. The patient is accompanied by a friend. Hospitalized on 01/22-24/2017. She had nausea/vomiting. She had acute renal injury because dehydration and large biliary drainage from catheter. After admission she was thrombocytopenic, with the platelet counts of 48,000, on 01/24/2017, however no bleeding. She now has completely recovered platelets at 287,000. She also has anemia secondary to chemotherapy. Prior to chemotherapy her hemoglobin was above 15 g, and in the past 4 weeks her hemoglobin has been trending down; today it is 11.7. The patient also has leukocytopenia but no neutropenia 2 weeks after chemotherapy, however her neutrophil count today rebounded to 8600 out of a total WBC of 10,300.        PAST MEDICAL HISTORY:    1. Diabetes, type 2, for about six years, diagnosed likely in 2008.    2. Anxiety/depression. " "       SURGICAL HISTORY:    1.  section in 1979.    2. Cholecystectomy and exploratory laparotomy with choledochojejunostomy on 2014 by Dr. Duncan Barker.    3. PowerPort placement by Dr. Barker.    4. Ventral hepatectomy of segments 4B and 5, resection of intrahepatic bile duct, resection of extrahepatic bile duct with taken down off previous Meeta-en-Y, resection of small bowel, intrahepatic hepaticojejunostomy ×2 and periportal lymph node dissection on 2015.      OB-GYN HISTORY:  1, para 1. Menarche at age of 12, menopause at age 57 in .      ONCOLOGIC HISTORY:   Incidental finding of gallbladder malignancy with involvement of the common bile duct, status post-surgical resection with positive margins on 2014; stage III (T3N0M0).      The patient has been evaluated by Dr. Jesse Galeano at [facility] for possibility of re-resection for positive margins; She finished with \"neoadjuvant\" chemotherapy prior to further surgery with 3 cycles of cisplatin and Gemzar by end of 2014.      Patient had ventral hepatectomy of segments 4B and 5, resection of intrahepatic bile duct, resection of extrahepatic bile duct with taken down of previous Meeta-en-Y, resection of small bowel, intrahepatic hepaticojejunostomy ×2 and periportal lymph node dissection on 2015. Pathology evaluation showed no residual cancer.       Patient subsequently had surgical intervention by Dr. Jesse Galeano at [facility]. Since that time she has PTC drainage catheter in place. She has been followed by Dr. Galeano periodically.      According to the patient and her daughter, this patient started having abdominal discomfort/distention in 10/2016, for which she actually was hospitalized at [facility], with replacement of the PTC drainage catheter. had episodes of acute renal failure in October because of dehydration. She " eventually recovered renal function. She had CT scan examination prior to that which showed no evidence of malignancy. She did have stenosis of the biliary tree, with extension/dilatation of the intrahepatic bile duct, more so on the left side. Nevertheless, this patient had more problem with abdominal distention in 11/2016 and 12/2016.  laboratory study on 12/12/2015 reported creatinine 0.5.      She had CT examination of the abdomen and pelvis on 12/12/2016. This study was compared to the CT scan obtained from 09/28/2016. This reported new moderate ascites within the abdomen and pelvis. There is perisplenic and mild perihepatic ascites. The right external/internal biliary drainage catheter in placement, with other relevant postsurgical changes. There remains dilatation of the left hepatic duct but improved since prior examination. There was splenomegaly measuring 15.5 cm; adrenal glands were all within normal limits. Pancreas was unremarkable and kidneys also appeared unremarkable. There are scattered known enlarged mesenteric and retroperitoneal lymph nodes. There is edema stranding with injection at the abdomen. There is nonspecific, mild nodular appearance of the mesentery within the abdomen, with peritoneal carcinomatosis cannot be excluded. There was stable epigastric and anterior hepatic lymph nodes. The stomach and small bowel loops appear within normal limits. There is no bowel obstruction. The large bowel shows scattered colonic diverticulosis without acute diverticulitis. There were no acute bony abnormalities.      Patient had paracentesis on 12/15/2016 at [facility]. Pathology evaluation with specimen #[id number] reported positive malignant cells, consistent with adenocarcinoma. Immunohistochemical staining was positive for AE1/AE3, CEA, B72.3, CDX2, CK7, CK20, but negative for calretinin, WT-1, CD68, CD15, TTF-1, mucicarmine.      Patient had nausea and vomiting, required a brief hospitalization  from 12/28/2016 through 12/30/2016 at [facility], with the Surgical Oncology service. She was given hydration and supportive care. The patient reports she has improved symptoms, with less nausea, and also started to have better drinking and eating. The patient reports the Zofran tablets were from 2 years ago, and probably does not work well. She denies fever, sweating or chills. No dysuria or hematuria. Performance status ECOG 1. She has no chest pain, no shortness of breath. She does seem to have weight loss. In the past 2 months, she lost 34 pounds per computer records. Partially, that is likely related to her ascitic fluid. She had 2nd paracentesis in end of 12/2016 during her hospitalization at [facility]. Her performance status is ECOG 1. Her pain is controlled with p.r.n. oxycodone. She takes about 2 tablets a day. She reports hydrocodone/acetaminophen does not work as well.     Laboratory study on 01/03/2017 showed elevated CA 19-9 at 449.9 units/ML, and slightly  elevated CEA at 17.64 NG/ML. With this piece of information, I do not think we need to further obtain tissue biopsy, this is metastatic gallbladder cancer based on her history and laboratory test with elevated tumor marker CA 19-9.     Patient had a PET scan on 01/05/2017. There is hypermetabolic carcinomatosis throughout the abdomen and pelvis. The localized regions of confluent hypermetabolic activity within the liver are of unclear etiology as no definite corresponding intrahepatic mass is seen on the CT sequence. However, the heterogeneous pattern of liver metabolic activity is suspicious for intrahepatic metastases.     MEDICATIONS: The current medication list was reviewed with the patient and updated in EMR this date per medical assistant. Medication dosages and frequencies were confirmed to be accurate.        ALLERGIES: No known drug allergies.        SOCIAL HISTORY: The patient is . She is  of administration  "in a company. No history of cigarette smoking. No alcohol use. No illegal drugs. No risk for HIV.        FAMILY HISTORY: No malignancy in the family. Both parents had heart disease and a brother has a gallbladder stone.        REVIEW OF SYSTEMS:                PAIN: See VITAL SIGNS below.    GENERAL: He history of present illness    SKIN: No rashes or nonhealing lesions.    HEME/LYMPH: No anemia and no easy bleeding bruising no lymphadenopathy.   EYES: No vision changes or diplopia.    ENT: No tinnitus, hearing loss, gum bleeding, epistaxis, hoarseness or dysphagia.    RESPIRATORY: No cough, shortness of breath, hemoptysis or wheezing.    CVS: No chest pain, palpitations, orthopnea, dyspnea on exertion or PND.    GI: See history of present illness.   : No dysuria or hematuria. No abnormal vaginal discharge or bleeding.    MUSCULOSKELETAL: No bone pain or joint stiffness.    NEUROLOGICAL: No dizziness, global weakness, loss of consciousness or seizures.    PSYCHIATRIC: No increased nervousness, mood changes or depression.        VITAL SIGNS:    Vitals:    17 0827   BP: 100/68   Pulse: 93   Resp: 14   Temp: 98.1 °F (36.7 °C)   TempSrc: Oral   SpO2: 96%   Weight: 229 lb 6.4 oz (104 kg)   Height: 61.02\" (155 cm)   PainSc: 0-No pain   ECO        PHYSICAL EXAMINATION:    GENERAL: Well-developed, moderately obese  female, not in acute distress.    SKIN: Warm, dry without rashes, purpura or petechiae.    HEAD: Normocephalic.    EYES: Pupils equal, round and reactive to light. EOMs are intact. Conjunctivae are normal.    EARS: Hearing intact.    NOSE: No excoriations or nasal discharge.    MOUTH: Tongue is well papillated. No stomatitis or ulcers. Lips are normal.    THROAT: Oropharynx without lesions or exudates.    NECK: Supple with good range of motion; no thyromegaly or masses.    LYMPHATICS: No cervical, supraclavicular, axillary or inguinal adenopathy.    CHEST: Lungs clear to auscultation. "    CARDIAC: Regular rate and rhythm without murmurs, rubs or gallops.    ABDOMEN: Soft, nontender with no hepatosplenomegaly, right-sided abdominal wall, with PTC drainage catheter in place, but clamped. Bowel sounds present.    EXTREMITIES: No clubbing, cyanosis or edema.    NEURO: No focal deficits.        LABORATORY DATA:          Lab Results   Component Value Date    WBC 10.31 (H) 01/31/2017    HGB 11.7 01/31/2017    HCT 34.2 01/31/2017    MCV 83.4 01/31/2017     01/31/2017     Lab Results   Component Value Date    NEUTROABS 8.65 (H) 01/31/2017     Lab Results   Component Value Date    GLUCOSE 275 (H) 01/31/2017    BUN 60 (H) 01/31/2017    CREATININE 1.15 (H) 01/31/2017    EGFRIFNONA 48 (L) 01/31/2017    EGFRIFAFRI  09/12/2016      Comment:      <15 Indicative of kidney failure.    BCR 52.2 (H) 01/31/2017    CO2 22.1 01/31/2017    CALCIUM 8.6 01/31/2017    PROTENTOTREF 7.3 01/08/2016    ALBUMIN 2.90 (L) 01/31/2017    LABIL2 0.8 (L) 01/31/2017    AST 41 (H) 01/31/2017    ALT 44 (H) 01/31/2017     SODIUM   Date Value Ref Range Status   01/31/2017 127 (C) 134 - 145 mmol/L Final     Comment:     Repeated and verified = 127   01/08/2016 140 134 - 144 mmol/L Final     POTASSIUM   Date Value Ref Range Status   01/31/2017 5.1 (H) 3.5 - 4.7 mmol/L Final   01/08/2016 4.0 3.5 - 5.2 mmol/L Final     TOTAL BILIRUBIN   Date Value Ref Range Status   01/31/2017 0.6 0.1 - 1.2 mg/dL Final   01/08/2016 0.7 0.0 - 1.2 mg/dL Final     ALKALINE PHOSPHATASE   Date Value Ref Range Status   01/31/2017 187 (H) 38 - 116 U/L Final   01/08/2016 103 39 - 117 IU/L Final   ]      ASSESSMENT:    1. 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  Patient had elevated tumor marker CA 19-9.  The plan was to start patient on Gemzar and cisplatin, however because of her renal injury, cisplatin was on hold, she was started on single agent Gemzar on 01/10/2017.  Overall she tolerated chemotherapy physically.   However she does have severe Neutropenia on Cycle 1 Day 14.  Her Glenn Platelets Was 40,000 and There Is No Bleeding, Today Her Platelets Has Recovered above 250,000.  Patient is doing well right now, no nausea vomiting, and her renal function showed a creatinine 1.15 which also has improved.  Discussed with patient today, I will start her on cisplatin, however split it for day #1 and day #8.  Patient is agreeable.     2.  Severe thrombocytopenia with platelets 48,000 on cycle 1 day 15, no bleeding episodes.  Discussed with patient, Will dose reduce Gemzar by 15% today from Jan 31, 2011.     3.  Acute renal injury.  This has improved due to her proper oral intake.  However considering adding cisplatin, I recommended giving her extra IV hydration normal saline 1 L for 2 days after each dose of chemotherapy.     4.  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  Currently she takes salts tablet 1 g 3 times a day.  Discussed with the patient, I also recommended she add more salt to her food.     5.  Hypomagnesemia.  We'll give 2 g of magnesium sulfate IV tomorrow.     6.  Hypokalemia, this is overall corrected, she is taking oral potassium chloride 3 times a day.  Patient was instructed to decreased to twice a day.     7.  Anemia secondary to chemotherapy.  Her hemoglobin dropped more than 2 g since starting chemotherapy.     8.  Leukocytopenia secondary to chemotherapy.  Her WBC has very bothering her.  We'll monitor CBC weekly.  If she has worsening leukocytopenia, causing delay of her chemotherapy, this patient will need growth factors for marrow support.      9. Hyperglycemia. This patient is diabetic. According to her her glucose level was well controlled around 120-150 in the morning. Discussed with the patient, since we will use dexamethasone pre-and post chemotherapy, certainly her glucose level will be discovered. She she already uses insulin and a home. Hopefully this will help control her diabetes.       10. Nausea and vomiting secondary to malignant ascites fluid and chemotherapy.  Patient day he is using Sancuso patch, with good control of symptom.  She will be given them and today prior to chemotherapy.  We'll also try Aloxi a few days later.  She also has Compazine, take every 6-8 hours as needed.  Expecting her nausea vomiting will recur especially after adding cisplatin to her chemotherapy regimen.      11. Cancer related pain. Patient responded better to oxycodone.        PLAN:    1. Proceed ahead with cycle #2 day 1 chemotherapy, adding cisplatin by splitting to day 1 and day 8 together with Gemzar.  We'll decrease Gemzar by 15%.    2. IV hydration with Normal saline 1 L for 2 days after each day of chemotherapy.     3. Magnesium sulfate 2 g IV tomorrow on 02/01/2017.   4. Degrees oral potassium chloride to twice a day.   5. Continue oral sodium chloride 1 g 3 times a day.  In the more salt to her food.   6. Continue use antiemetics and narcotics for pain control and nausea vomiting.    7. patient will return in 1 week, with labs, cbc, BMP, mag and day #8 Gemzar + Cisplatin.   8. Weekly cbc   9. Patient will have M.D. evaluation, in about 3 weeks, on cycle 3 day 1 chemotherapy. Tumor marker will be repeated at that time.      50 minutes, over half of that time were used for counseling. Questions were answered to their satisfaction.          REZA WORRELL M.D., Ph.D.      01/31/2017            cc: KARLA WATSON M.D.    RUBY ARANA M.D. ([facility])

## 2017-02-01 PROBLEM — T45.1X5A CHEMOTHERAPY-INDUCED NAUSEA AND VOMITING: Status: ACTIVE | Noted: 2017-01-01

## 2017-02-01 NOTE — TELEPHONE ENCOUNTER
Corrected previous order sent to pharmacy on 1/30/17.   Ms. Ramírez uses the KwickPen not cartridges.

## 2017-02-01 NOTE — PROGRESS NOTES
Pt here today for 1 liter of NS w/ 2gm magnesium. States she had uncontrolled nausea and vomiting since she got home from chemo appt yesterday. States she was up vomiting all night and was finally able to get some relief after taking a phenergan suppository at 7am today. States she is feeling nauseated again now. Discussed with Dr. Sullivan and per his order, given phenergan 25mg IVP and ativan 1mg IVP. Pt also given a script for ativan 1mg tablets to take every 8hrs as needed. Reviewed medications with pt and her mother and they verbalize understanding. Pt resting comfortably after administration of phenergan and ativan. Will continue to monitor.

## 2017-02-01 NOTE — TELEPHONE ENCOUNTER
----- Message from Hemalatha Prieto sent at 2/1/2017  3:17 PM EST -----  Contact: Marlon Mason called needing verification on patient's medication.  Please verify if patient is to have Humalog KwickPen or cartridge.  Please advise.     Pharmacy:  [facility] [address] [place] [address] AT [facility] [phone] [facility] [phone]

## 2017-02-03 NOTE — TELEPHONE ENCOUNTER
----- Message from Angelica Boyd sent at 2/3/2017  9:45 AM EST -----  Contact: [phone]   Pt calling to see what can be taken OTC for a sinus infection?    Pt states she is having some nasal drainage and wanted to know if she can take zyrtec for her sinuses. I informed her that she can. Pt v/u

## 2017-02-07 PROBLEM — R11.2 INCREASED NAUSEA AND VOMITING: Status: ACTIVE | Noted: 2017-01-01

## 2017-02-07 NOTE — PROGRESS NOTES
"REASONS FOR FOLLOWUP:    1. History of gallbladder status post-surgical resection with positive margins on 2014; stage III (T3N0M0).   Had \"neoadjuvant\" chemotherapy with 3 cycles of cisplatin and Gemzar by end of 2014.  Patient had ventral hepatectomy of segments 4B and 5. Pathology evaluation showed no residual cancer.   2. Ascites fluids, paracentesis on 12/15/2016, pathology evaluation positive for adenocarcinoma. Laboratory study on 2017 showed elevated CA 19-9 at 449.9 units/ML, and slightly elevated CEA at 17.64 NG/ML.   3. PET scan on 2017 reported diffuse mesenteric carcinomatosis.   4. Patient was started on palliative chemotherapy with Gemzar on 01/10/2017. Cisplatin was on hold due to renal injury.  5. Severe thrombocytopenia secondary to chemotherapy, Gemzar will be 15% of reduced from cycle #2.   6. Nausea vomiting dehydration and acute renal failure, required a brief hospitalization from -2017.       HISTORY OF PRESENT ILLNESS: Ms. Ramírez is a 61-year-old  female, presented for reevaluation.  Patient's daughter called today, reporting patient does not want to further chemotherapy, she's been having nausea vomiting for the past 2 weeks, despite the using multiple medications including Sancuso patch, Zofran, Phenergan, Compazine, and ativan.  She is barely able to keep food down, because usually eating makes her more nauseated, despite that she is hungry.  She reports beef broth seems better.    Patient reports coldness, no fever sweating chills.  She also complains pains, however not significant, she does take oxycodone 1 to 2 a day.  Upon questioning, she also reports more distention in her abdomen.  She also reports swelling in the lower extremities.       PAST MEDICAL HISTORY:    1. Diabetes, type 2, for about six years, diagnosed likely in .    2. Anxiety/depression.    3. Gallbladder cancer.       SURGICAL HISTORY:    1.  section in " "1979.    2. Cholecystectomy and exploratory laparotomy with choledochojejunostomy on 2014 by Dr. Duncan Barker.    3. PowerPort placement by Dr. Barker.    4. Ventral hepatectomy of segments 4B and 5, resection of intrahepatic bile duct, resection of extrahepatic bile duct with taken down off previous Meeta-en-Y, resection of small bowel, intrahepatic hepaticojejunostomy ×2 and periportal lymph node dissection on 2015.      OB-GYN HISTORY:  1, para 1. Menarche at age of 12, menopause at age 57 in .       ONCOLOGIC HISTORY:   Incidental finding of gallbladder malignancy with involvement of the common bile duct, status post-surgical resection with positive margins on 2014; stage III (T3N0M0).       The patient has been evaluated by Dr. Jesse Galeano at [facility] for possibility of re-resection for positive margins; She finished with \"neoadjuvant\" chemotherapy prior to further surgery with 3 cycles of cisplatin and Gemzar by end of 2014.       Patient had ventral hepatectomy of segments 4B and 5, resection of intrahepatic bile duct, resection of extrahepatic bile duct with taken down of previous Meeta-en-Y, resection of small bowel, intrahepatic hepaticojejunostomy ×2 and periportal lymph node dissection on 2015. Pathology evaluation showed no residual cancer.       Patient subsequently had surgical intervention by Dr. Jesse Galeano at [facility]. Since that time she has PTC drainage catheter in place. She has been followed by Dr. Galeano periodically.       According to the patient and her daughter, this patient started having abdominal discomfort/distention in 10/2016, for which she actually was hospitalized at [facility], with replacement of the PTC drainage catheter. had episodes of acute renal failure in October because of dehydration. She eventually recovered renal function. She had CT scan " examination prior to that which showed no evidence of malignancy. She did have stenosis of the biliary tree, with extension/dilatation of the intrahepatic bile duct, more so on the left side. Nevertheless, this patient had more problem with abdominal distention in 11/2016 and 12/2016. laboratory study on 12/12/2015 reported creatinine 0.5.       She had CT examination of the abdomen and pelvis on 12/12/2016. This study was compared to the CT scan obtained from 09/28/2016. This reported new moderate ascites within the abdomen and pelvis. There is perisplenic and mild perihepatic ascites. The right external/internal biliary drainage catheter in placement, with other relevant postsurgical changes. There remains dilatation of the left hepatic duct but improved since prior examination. There was splenomegaly measuring 15.5 cm; adrenal glands were all within normal limits. Pancreas was unremarkable and kidneys also appeared unremarkable. There are scattered known enlarged mesenteric and retroperitoneal lymph nodes. There is edema stranding with injection at the abdomen. There is nonspecific, mild nodular appearance of the mesentery within the abdomen, with peritoneal carcinomatosis cannot be excluded. There was stable epigastric and anterior hepatic lymph nodes. The stomach and small bowel loops appear within normal limits. There is no bowel obstruction. The large bowel shows scattered colonic diverticulosis without acute diverticulitis. There were no acute bony abnormalities.       Patient had paracentesis on 12/15/2016 at [facility]. Pathology evaluation with specimen #[id number] reported positive malignant cells, consistent with adenocarcinoma. Immunohistochemical staining was positive for AE1/AE3, CEA, B72.3, CDX2, CK7, CK20, but negative for calretinin, WT-1, CD68, CD15, TTF-1, mucicarmine.       Patient had nausea and vomiting, required a brief hospitalization from 12/28/2016 through 12/30/2016 at [facility], with the Surgical Oncology service. She was given hydration and supportive care. The patient reports she has improved symptoms, with less nausea, and also started to have better drinking and eating. The patient reports the Zofran tablets were from 2 years ago, and probably does not work well. She denies fever, sweating or chills. No dysuria or hematuria. Performance status ECOG 1. She has no chest pain, no shortness of breath. She does seem to have weight loss. In the past 2 months, she lost 34 pounds per computer records. Partially, that is likely related to her ascitic fluid. She had 2nd paracentesis in end of 12/2016 during her hospitalization at [facility]. Her performance status is ECOG 1. Her pain is controlled with p.r.n. oxycodone. She takes about 2 tablets a day. She reports hydrocodone/acetaminophen does not work as well.      Laboratory study on 01/03/2017 showed elevated CA 19-9 at 449.9 units/ML, and slightly  elevated CEA at 17.64 NG/ML. With this piece of information, I do not think we need to further obtain tissue biopsy, this is metastatic gallbladder cancer based on her history and laboratory test with elevated tumor marker CA 19-9.      Patient had a PET scan on 01/05/2017. There is hypermetabolic carcinomatosis throughout the abdomen and pelvis. The localized regions of confluent hypermetabolic activity within the liver are of unclear etiology as no definite corresponding intrahepatic mass is seen on the CT sequence. However, the heterogeneous pattern of liver metabolic activity is suspicious for intrahepatic metastases.      MEDICATIONS: The current medication list was reviewed with the patient and updated in EMR this date per medical assistant. Medication dosages and frequencies were confirmed to be accurate.        ALLERGIES: No known drug allergies.        SOCIAL HISTORY: The patient is . She is  of administration in a company. No history of cigarette  "smoking. No alcohol use. No illegal drugs. No risk for HIV.        FAMILY HISTORY: No malignancy in the family. Both parents had heart disease and a brother has a gallbladder stone.        REVIEW OF SYSTEMS:     PAIN: See VITAL SIGNS below.    GENERAL: He history of present illness    SKIN: No rashes or nonhealing lesions.    HEME/LYMPH: No anemia and no easy bleeding bruising no lymphadenopathy.   EYES: No vision changes or diplopia.    ENT: No tinnitus, hearing loss, gum bleeding, epistaxis, hoarseness or dysphagia.    RESPIRATORY: No cough, shortness of breath, hemoptysis or wheezing.    CVS: No chest pain, palpitations, orthopnea, dyspnea on exertion or PND.    GI: See history of present illness.   : No dysuria or hematuria. No abnormal vaginal discharge or bleeding.    MUSCULOSKELETAL: No bone pain or joint stiffness.    NEUROLOGICAL: No dizziness, global weakness, loss of consciousness or seizures.    PSYCHIATRIC: No increased nervousness, mood changes or depression.        VITAL SIGNS:    Vitals:    02/07/17 1236   BP: 138/76   Pulse: 104   Resp: 16   Temp: 98.7 °F (37.1 °C)   TempSrc: Oral   SpO2: 99%   Weight: 238 lb (108 kg)   Height: 61.02\" (155 cm)   PainSc: 0-No pain   ECOG: 3        PHYSICAL EXAMINATION:    GENERAL: Well-developed, moderately obese  female, sitting in  wheelchair, covered with 2 blankets.   SKIN: Warm, dry without rashes, purpura or petechiae.    HEAD: Normocephalic.    EYES:  Conjunctivae are normal.    EARS: Hearing intact.    MOUTH:  Lips are normal.    NECK: Supple with good range of motion; no thyromegaly or masses.    LYMPHATICS: No cervical adenopathy.    CHEST: Lungs clear to auscultation.    CARDIAC: Regular rate and rhythm without murmurs, rubs or gallops.    ABDOMEN:  distended, right-sided abdominal wall, with PTC drainage catheter in place, but clamped.   EXTREMITIES: No clubbing, cyanosis. 1 + edema on both legs.    NEURO: No focal deficits.        LABORATORY " DATA:        Lab Results   Component Value Date    INR 1.10 02/07/2017    PROTIME 13.1 02/07/2017         ASSESSMENT:    1. 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 Patient had elevated tumor marker CA 19-9.  Patient was started on chemotherapy, Gemzar on 01/10/2017.  Cisplatin was originally not given in cycle #1 chemotherapy due to her acute renal injury and dehydration.    Cisplatin was given on cycle #2 day 1 treatment on 01/31/2017.  Her nausea vomiting was getting worse, despite multiple antiemetics.  Very poor appetite despite feeling hungry. She also has worsening performance status ECOG 3.  Patient to prefers not to pursue further palliative chemotherapy, because she does not if you any improvement in all, instead of her situations getting worse.  Discussed with the patient for home hospice care, she is agreeable.  She may have about 2 months.     2.  Malignant ascites fluid.  Patient has more distention of abdomen.  Physical examination shows more distended abdomen.  I obtained INR 1.10 today.  we'll arrange patient for ultrasound-guided paracentesis as soon as possible, and scheduled about every 10 days, for symptom control and maintaining her quality of life.  Hopefully this will also improve her nausea vomiting.      3. Nausea and vomiting secondary to malignant ascites fluid and chemotherapy. Patient is on Sancuso patch, she was also given Emend and Aloxi on day of chemotherapy.  She also uses multiple other antiemetics including Compazine, Phenergan and Ativan, dexamethasone.  We'll continue.     4. Cancer related pain. Patient will continue oxycodone prn.          PLAN:    1. No further chemotherapy.  We'll refer patient to home hospice care.  2. Arrange therapeutic paracentesis as soon as possible, and also arrange paracentesis every 10 days.  3. Continue oral antiemetics plus Sancuso patch.   4. Patient will have M.D. evaluation, in about 4 weeks.  If  needed we can see her earlier.      45 minutes, over half of that time were used for counseling to patient and her daughter.           REZA WORRELL M.D., Ph.D.      02/07/2017               cc: KARLA WATSON M.D.    RUBY ARANA M.D. ([facility])    [facility].

## 2017-02-07 NOTE — TELEPHONE ENCOUNTER
----- Message from Isa Spann RN sent at 2/7/2017  8:16 AM EST -----  Dr Sullivan wants to see her today.  She is not going to take chemo anymore and needs to cancel that appointment but can see Dr. Sullivan .  I told her you all would call her with a time.  Thanks

## 2017-02-07 NOTE — TELEPHONE ENCOUNTER
----- Message from Eula Anthony sent at 2/7/2017  8:06 AM EST -----  Pt says she needs to talk to a nurse        [phone]

## 2017-02-07 NOTE — PROGRESS NOTES
Pt referred to [facility] today by Dr. Sullivan, who will be attending. Signed orders and records faxed to [facility] by the .

## 2017-02-07 NOTE — TELEPHONE ENCOUNTER
Patient called today to say that she would no longer like to take chemo. She stated that she has had more bad days than good days.  Sent a message to scheduling to cancel chemo treatment and to add to Dr. Rodas schedule.  Patient DANTE

## 2017-02-08 NOTE — PROGRESS NOTES
Pt here for IV fluids, spoke with Dr. Sullivan okay to give 500 NS today if pt wishes.  Pt no longer going to receive chemo but okay to proceed with Fluids

## 2017-02-09 NOTE — PROGRESS NOTES
Pt c/o intense nausea prior to d/c, has Sancuso patch in place. Spoke with Angelica MCCANN, order received for IV phenergan 6.25mg.

## 2017-02-28 NOTE — TELEPHONE ENCOUNTER
----- Message from Angelica Boyd sent at 2/28/2017 12:25 PM EST -----  Contact: [phone] Jenn    Pt needs order for paracentesis .      Per Dr. quinonez's dictation, patient to have therapeutic paracentesis every 10 days.  Will place order.

## 2017-02-28 NOTE — TELEPHONE ENCOUNTER
----- Message from Angelica Boyd sent at 2/28/2017  3:13 PM EST -----  Contact: Maura Adame [phone]   Calling to see ok Dr. Marge Pham with [facility] comes to see patient for eval?      Verbal ok for hospice to see patient.

## 2017-03-01 NOTE — TELEPHONE ENCOUNTER
----- Message from Cj Aviles sent at 3/1/2017  3:56 PM EST -----   Sushma with hospice is calling to get order                [phone]

## 2017-03-01 NOTE — TELEPHONE ENCOUNTER
Per , ok to increase oxycodone to 10mg and add trazadone 50mg QHS for patients trouble sleeping. Notified Sushma with hospice. No further questions or concerns.

## 2017-03-07 NOTE — PROGRESS NOTES
"REASONS FOR FOLLOWUP:    1. History of gallbladder status post-surgical resection with positive margins on 2014; stage III (T3N0M0).   Had \"neoadjuvant\" chemotherapy with 3 cycles of cisplatin and Gemzar by end of 2014.  Patient had ventral hepatectomy of segments 4B and 5. Pathology evaluation showed no residual cancer.   2. Ascites fluids, paracentesis on 12/15/2016, pathology evaluation positive for adenocarcinoma. Laboratory study on 2017 showed elevated CA 19-9 at 449.9 units/ML, and slightly elevated CEA at 17.64 NG/ML.   3. PET scan on 2017 reported diffuse mesenteric carcinomatosis.   4. Patient was started on palliative chemotherapy with Gemzar on 01/10/2017. Cisplatin was on hold due to renal injury.  5. Severe thrombocytopenia secondary to chemotherapy, Gemzar will be 15% of reduced from cycle #2.   6. Nausea vomiting dehydration and acute renal failure, required a brief hospitalization from -2017.   7. Poor tolerance to chemotherapy, she requested a home hospice care in early 2017.  She continued on therapeutic paracentesis due to large quantity of malignant ascites.             HISTORY OF PRESENT ILLNESS: Ms. Ramírez is a 61-year-old  female, presented for reevaluation.  She is accompanied by her daughter.   Since her last visit in early 2017, about 4 weeks ago, she has had 3 ultrasound-guided paracentesis.  The largest one was 3700 mL.  The most recent one was a week ago on , when she had 1500 mL ascites removed.        PAST MEDICAL HISTORY:    1. Diabetes, type 2, for about six years, diagnosed likely in .    2. Anxiety/depression.    3. Gallbladder cancer.       SURGICAL HISTORY:    1.  section in 1979.    2. Cholecystectomy and exploratory laparotomy with choledochojejunostomy on 2014 by Dr. Duncan Barker.    3. PowerPort placement by Dr. Barker.    4. Ventral hepatectomy of segments 4B and 5, " "resection of intrahepatic bile duct, resection of extrahepatic bile duct with taken down off previous Meeta-en-Y, resection of small bowel, intrahepatic hepaticojejunostomy ×2 and periportal lymph node dissection on 2015.      OB-GYN HISTORY:  1, para 1. Menarche at age of 12, menopause at age 57 in .        ONCOLOGIC HISTORY:   Incidental finding of gallbladder malignancy with involvement of the common bile duct, status post-surgical resection with positive margins on 2014; stage III (T3N0M0).        The patient has been evaluated by Dr. Jesse Galeano at [facility] for possibility of re-resection for positive margins; She finished with \"neoadjuvant\" chemotherapy prior to further surgery with 3 cycles of cisplatin and Gemzar by end of 2014.        Patient had ventral hepatectomy of segments 4B and 5, resection of intrahepatic bile duct, resection of extrahepatic bile duct with taken down of previous Meeta-en-Y, resection of small bowel, intrahepatic hepaticojejunostomy ×2 and periportal lymph node dissection on 2015. Pathology evaluation showed no residual cancer.       Patient subsequently had surgical intervention by Dr. Jesse Galeano at [facility]. Since that time she has PTC drainage catheter in place. She has been followed by Dr. Galeano periodically.       According to the patient and her daughter, this patient started having abdominal discomfort/distention in 10/2016, for which she actually was hospitalized at [facility], with replacement of the PTC drainage catheter. had episodes of acute renal failure in October because of dehydration. She eventually recovered renal function. She had CT scan examination prior to that which showed no evidence of malignancy. She did have stenosis of the biliary tree, with extension/dilatation of the intrahepatic bile duct, more so on the left side. Nevertheless, this " patient had more problem with abdominal distention in 11/2016 and 12/2016. laboratory study on 12/12/2015 reported creatinine 0.5.       She had CT examination of the abdomen and pelvis on 12/12/2016. This study was compared to the CT scan obtained from 09/28/2016. This reported new moderate ascites within the abdomen and pelvis. There is perisplenic and mild perihepatic ascites. The right external/internal biliary drainage catheter in placement, with other relevant postsurgical changes. There remains dilatation of the left hepatic duct but improved since prior examination. There was splenomegaly measuring 15.5 cm; adrenal glands were all within normal limits. Pancreas was unremarkable and kidneys also appeared unremarkable. There are scattered known enlarged mesenteric and retroperitoneal lymph nodes. There is edema stranding with injection at the abdomen. There is nonspecific, mild nodular appearance of the mesentery within the abdomen, with peritoneal carcinomatosis cannot be excluded. There was stable epigastric and anterior hepatic lymph nodes. The stomach and small bowel loops appear within normal limits. There is no bowel obstruction. The large bowel shows scattered colonic diverticulosis without acute diverticulitis. There were no acute bony abnormalities.       Patient had paracentesis on 12/15/2016 at [facility]. Pathology evaluation with specimen #[id number] reported positive malignant cells, consistent with adenocarcinoma. Immunohistochemical staining was positive for AE1/AE3, CEA, B72.3, CDX2, CK7, CK20, but negative for calretinin, WT-1, CD68, CD15, TTF-1, mucicarmine.       Patient had nausea and vomiting, required a brief hospitalization from 12/28/2016 through 12/30/2016 at [facility], with the Surgical Oncology service. She was given hydration and supportive care. The patient reports she has improved symptoms, with less nausea, and also started to have better drinking and eating. The  patient reports the Zofran tablets were from 2 years ago, and probably does not work well. She denies fever, sweating or chills. No dysuria or hematuria. Performance status ECOG 1. She has no chest pain, no shortness of breath. She does seem to have weight loss. In the past 2 months, she lost 34 pounds per computer records. Partially, that is likely related to her ascitic fluid. She had 2nd paracentesis in end of 12/2016 during her hospitalization at [facility]. Her performance status is ECOG 1. Her pain is controlled with p.r.n. oxycodone. She takes about 2 tablets a day. She reports hydrocodone/acetaminophen does not work as well.       Laboratory study on 01/03/2017 showed elevated CA 19-9 at 449.9 units/ML, and slightly elevated CEA at 17.64 NG/ML. With this piece of information, I do not think we need to further obtain tissue biopsy, this is metastatic gallbladder cancer based on her history and laboratory test with elevated tumor marker CA 19-9.       Patient had a PET scan on 01/05/2017. There is hypermetabolic carcinomatosis throughout the abdomen and pelvis. The localized regions of confluent hypermetabolic activity within the liver are of unclear etiology as no definite corresponding intrahepatic mass is seen on the CT sequence. However, the heterogeneous pattern of liver metabolic activity is suspicious for intrahepatic metastases.       MEDICATIONS: The current medication list was reviewed with the patient and updated in EMR this date per medical assistant. Medication dosages and frequencies were confirmed to be accurate.        ALLERGIES: No known drug allergies.        SOCIAL HISTORY: The patient is . She is  of administration in a company. No history of cigarette smoking. No alcohol use. No illegal drugs. No risk for HIV.        FAMILY HISTORY: No malignancy in the family. Both parents had heart disease and a brother has a gallbladder stone.        REVIEW OF SYSTEMS:  "   PAIN: See VITAL SIGNS below.    GENERAL: He history of present illness    SKIN: No rashes or nonhealing lesions.    HEME/LYMPH: No anemia and no easy bleeding bruising no lymphadenopathy.   EYES: No vision changes or diplopia.    ENT: No tinnitus, hearing loss, gum bleeding, epistaxis, hoarseness or dysphagia.    RESPIRATORY: No cough, shortness of breath, hemoptysis or wheezing.    CVS: No chest pain, palpitations, orthopnea, dyspnea on exertion or PND.    GI: See history of present illness.   : No dysuria or hematuria. No abnormal vaginal discharge or bleeding.    MUSCULOSKELETAL: No bone pain or joint stiffness.    NEUROLOGICAL: No dizziness, global weakness, loss of consciousness or seizures.    PSYCHIATRIC: No increased nervousness, mood changes or depression.        VITAL SIGNS:    Vitals:    03/07/17 1605   BP: 118/76   Pulse: 101   Resp: 12   Temp: 98.3 °F (36.8 °C)   TempSrc: Oral   SpO2: 96%   Weight: 224 lb (102 kg)   Height: 61.02\" (155 cm)   PainSc: 0-No pain   ECOG: 3        PHYSICAL EXAMINATION:    GENERAL: Well-developed, moderately obese  female, sitting in  wheelchair, not in acute distress but ill looking.    SKIN: Warm, dry without rashes, purpura or petechiae.    HEAD: Normocephalic.    EYES: Conjunctivae are normal.    EARS: Hearing intact.    MOUTH: Lips are normal.    NECK: Supple with good range of motion; no thyromegaly or masses.    LYMPHATICS: No cervical adenopathy.    CHEST: Lungs clear to auscultation.    CARDIAC: Regular rate and rhythm without murmurs, rubs or gallops.    ABDOMEN:  distended, right-sided abdominal wall, with PTC drainage catheter in place, but clamped.   EXTREMITIES: No clubbing, cyanosis. 1 + edema on both legs.    NEURO: No focal deficits.        LABORATORY DATA:    None.          ASSESSMENT:    1. 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 Patient had elevated tumor marker CA 19-9.  Patient could not tolerate " the chemotherapy was cisplatin and Gemzar, not even able to finish 2 full cycles.  He opted for hospice care and symptom control, with repeated paracentesis for malignant ascites fluid.      2.  Malignant ascites fluid.  we'll arrange patient for ultrasound-guided paracentesis next week, and the repeated roughly about every 10-14 days.        3. Nausea and vomiting.  Continue oral medication.     4. Cancer related pain. Patient will continue oxycodone prn.           PLAN:  Ultrasound-guided paracentesis every 10-14 days.  Continue home hospice care.  We'll bring patient back in about 2 months for reevaluation.      REZA WORRELL M.D., Ph.D.        cc: KARLA WATSON M.D.    RUBY ARANA M.D. ([facility])    [facility].

## 2017-03-17 NOTE — TELEPHONE ENCOUNTER
----- Message from Cj Aviles sent at 3/17/2017 12:19 PM EDT -----  Contact: hospice   Maura with hospice is calling to get increase lozaepam to  every 4 hrs as needed          [phone]    Spoke with Maura frederick nurse. She states pt is actively dying and needs her ativan increased to every 4 hours PRN. I gave Maura the verbal to increase ativan. Maura v/vidal

## 2017-03-20 NOTE — TELEPHONE ENCOUNTER
----- Message from Cj Aviles sent at 3/20/2017 12:46 PM EDT -----   Maura with hospice is calling to get orders on pt        [phone]    Spoke with Maura. Maura wants to increase ativan every 2 hours instead of every 4 hours PRN. Switch oxy ir to roxanol 15mg every 2 hours as needed. Verbal ok for medication changes given. Maura ibarra&v

## 2017-03-24 NOTE — TELEPHONE ENCOUNTER
----- Message from Eula Anthony sent at 3/24/2017  3:46 PM EDT -----   Reba with hospice needs to talk to a nurse  [phone]    Hospice RN calling about increasing pain meds.  Said that patient called with extreme abdominal pain and hasn't had a bowel movement in 2 weeks.  Taking up to 4 senna per day. Hospice RN said she thinks she is actively dying.  I suggested to RN that yes they can increase morphine to every hour but that an RN should probably go out and check on her and make sure she isn't miserable with a bowel obstruction.  RN v/u and said someone would go out tonight to check on her.

## 2017-03-29 NOTE — TELEPHONE ENCOUNTER
----- Message from Angelica Boyd sent at 3/29/2017  4:11 PM EDT -----  Contact: Jenn daughter [phone]   Calling about getting pt a letter from doc stating she is disabled and no longer able to work due to her condition.      Msg forwarded to  to dictate letter that she is disabled and cannot work. Asked him to let triage know when finished so we can send her a copy.

## 2017-03-31 ENCOUNTER — TELEPHONE (OUTPATIENT)
Dept: ONCOLOGY | Facility: HOSPITAL | Age: 62
End: 2017-03-31

## 2017-03-31 NOTE — TELEPHONE ENCOUNTER
----- Message from Koki Mayer RN sent at 3/31/2017  8:27 AM EDT -----  Regarding: FW: letter       ----- Message -----     From: Vincenzo Sullivan MD PhD     Sent: 3/30/2017  11:51 PM       To: Koki Mayer RN  Subject: letter                                           I sent it to [employer], close to my office where Kiesha used to sit.     Thanks!       ----- Message -----     From: Koki Mayer RN     Sent: 3/29/2017   4:18 PM       To: Vincenzo Sullivan MD PhD, #     Pt Calling about getting a letter from [facility] stating she is disabled and no longer able to work due to her condition.     If you could dictate a letter about this and let triage know when its done so we can send her a copy that would be great. Thanks

## 2017-03-31 NOTE — TELEPHONE ENCOUNTER
Letter stamped with Dr Sullivan's signature and mailed to patient. Copy placed in scan folder. LVM for patient to let her know.

## 2017-05-18 ENCOUNTER — APPOINTMENT (OUTPATIENT)
Dept: LAB | Facility: HOSPITAL | Age: 62
End: 2017-05-18

## 2017-05-18 ENCOUNTER — APPOINTMENT (OUTPATIENT)
Dept: ONCOLOGY | Facility: CLINIC | Age: 62
End: 2017-05-18